# Patient Record
Sex: FEMALE | Race: BLACK OR AFRICAN AMERICAN | URBAN - METROPOLITAN AREA
[De-identification: names, ages, dates, MRNs, and addresses within clinical notes are randomized per-mention and may not be internally consistent; named-entity substitution may affect disease eponyms.]

---

## 2021-10-22 ENCOUNTER — TELEPHONE (OUTPATIENT)
Dept: DIABETES SERVICES | Age: 61
End: 2021-10-22

## 2021-10-22 NOTE — TELEPHONE ENCOUNTER
Call to patient about Diabetes Education. She has not had education since on Medicare. Assessment scheduled.

## 2021-12-07 ENCOUNTER — TELEPHONE (OUTPATIENT)
Dept: DIABETES SERVICES | Age: 61
End: 2021-12-07

## 2021-12-13 ENCOUNTER — TELEPHONE (OUTPATIENT)
Dept: DIABETES SERVICES | Age: 61
End: 2021-12-13

## 2021-12-13 NOTE — TELEPHONE ENCOUNTER
Mail box is full. Second attempt to reschedule after her no show for Diabetes assessment on 12-7-2021. .  Will mail letter. If we do not hear back from the letter by January 10, 2022, we will close the Diabetes file and notify physician.

## 2022-01-11 ENCOUNTER — DOCUMENTATION ONLY (OUTPATIENT)
Dept: DIABETES SERVICES | Age: 62
End: 2022-01-11

## 2022-01-11 NOTE — PROGRESS NOTES
Received no response from mailed letter about Diabetes Education. Will close file and notify referring.

## 2022-02-28 PROBLEM — E10.65 HYPERGLYCEMIA DUE TO TYPE 1 DIABETES MELLITUS (HCC): Status: ACTIVE | Noted: 2022-02-28

## 2022-02-28 PROBLEM — E10.22 CKD STAGE 2 DUE TO TYPE 1 DIABETES MELLITUS (HCC): Status: ACTIVE | Noted: 2022-02-28

## 2022-02-28 PROBLEM — R80.1 PERSISTENT PROTEINURIA: Status: ACTIVE | Noted: 2022-02-28

## 2022-02-28 PROBLEM — N18.2 CKD STAGE 2 DUE TO TYPE 1 DIABETES MELLITUS (HCC): Status: ACTIVE | Noted: 2022-02-28

## 2022-03-01 ENCOUNTER — TELEPHONE (OUTPATIENT)
Dept: DIABETES SERVICES | Age: 62
End: 2022-03-01

## 2022-03-08 ENCOUNTER — TELEPHONE (OUTPATIENT)
Dept: DIABETES SERVICES | Age: 62
End: 2022-03-08

## 2022-03-08 NOTE — TELEPHONE ENCOUNTER
Second attempt to contact pt. Mailbox is full and cannot leave a message. Will send a letter 3/9/22 and ask for a call back. If we don't hear from pt by 4/9/22 we will no longer pursue.

## 2022-03-19 PROBLEM — E10.65 HYPERGLYCEMIA DUE TO TYPE 1 DIABETES MELLITUS (HCC): Status: ACTIVE | Noted: 2022-02-28

## 2022-03-19 PROBLEM — R80.1 PERSISTENT PROTEINURIA: Status: ACTIVE | Noted: 2022-02-28

## 2022-03-19 PROBLEM — N18.2 CKD STAGE 2 DUE TO TYPE 1 DIABETES MELLITUS (HCC): Status: ACTIVE | Noted: 2022-02-28

## 2022-03-19 PROBLEM — E10.22 CKD STAGE 2 DUE TO TYPE 1 DIABETES MELLITUS (HCC): Status: ACTIVE | Noted: 2022-02-28

## 2022-04-11 ENCOUNTER — TELEPHONE (OUTPATIENT)
Dept: DIABETES SERVICES | Age: 62
End: 2022-04-11

## 2022-05-23 ENCOUNTER — TELEPHONE (OUTPATIENT)
Dept: ENDOCRINOLOGY | Age: 62
End: 2022-05-23

## 2022-05-23 DIAGNOSIS — E10.65 HYPERGLYCEMIA DUE TO TYPE 1 DIABETES MELLITUS (HCC): Primary | ICD-10-CM

## 2022-05-23 RX ORDER — PEN NEEDLE, DIABETIC 32GX 5/32"
1 NEEDLE, DISPOSABLE MISCELLANEOUS DAILY
Qty: 300 EACH | Refills: 3 | Status: SHIPPED | OUTPATIENT
Start: 2022-05-23 | End: 2022-06-01 | Stop reason: SDUPTHER

## 2022-05-23 RX ORDER — PEN NEEDLE, DIABETIC 32GX 5/32"
1 NEEDLE, DISPOSABLE MISCELLANEOUS DAILY
COMMUNITY
End: 2022-05-23 | Stop reason: SDUPTHER

## 2022-05-23 NOTE — TELEPHONE ENCOUNTER
Pt called and stated She needed a refill on her Pen needles. Pt stated she use 32 g by 4 mm, and wants them to go to [facility]. Rx has been sent to [facility] as requested.

## 2022-06-01 ENCOUNTER — OFFICE VISIT (OUTPATIENT)
Dept: ENDOCRINOLOGY | Age: 62
End: 2022-06-01
Payer: MEDICARE

## 2022-06-01 VITALS
OXYGEN SATURATION: 98 % | HEIGHT: 69 IN | SYSTOLIC BLOOD PRESSURE: 142 MMHG | WEIGHT: 152 LBS | HEART RATE: 77 BPM | BODY MASS INDEX: 22.51 KG/M2 | DIASTOLIC BLOOD PRESSURE: 80 MMHG | TEMPERATURE: 98.4 F

## 2022-06-01 DIAGNOSIS — I10 ESSENTIAL HYPERTENSION: ICD-10-CM

## 2022-06-01 DIAGNOSIS — E10.29 MICROALBUMINURIA DUE TO TYPE 1 DIABETES MELLITUS (HCC): ICD-10-CM

## 2022-06-01 DIAGNOSIS — R80.9 MICROALBUMINURIA DUE TO TYPE 1 DIABETES MELLITUS (HCC): ICD-10-CM

## 2022-06-01 DIAGNOSIS — Z13.21 ENCOUNTER FOR VITAMIN DEFICIENCY SCREENING: ICD-10-CM

## 2022-06-01 DIAGNOSIS — E78.00 HYPERCHOLESTEROLEMIA: ICD-10-CM

## 2022-06-01 DIAGNOSIS — E10.65 HYPERGLYCEMIA DUE TO TYPE 1 DIABETES MELLITUS (HCC): Primary | ICD-10-CM

## 2022-06-01 LAB — HBA1C MFR BLD: 12.3 %

## 2022-06-01 PROCEDURE — G8420 CALC BMI NORM PARAMETERS: HCPCS | Performed by: DIETITIAN, REGISTERED

## 2022-06-01 PROCEDURE — 99215 OFFICE O/P EST HI 40 MIN: CPT | Performed by: DIETITIAN, REGISTERED

## 2022-06-01 PROCEDURE — 3046F HEMOGLOBIN A1C LEVEL >9.0%: CPT | Performed by: DIETITIAN, REGISTERED

## 2022-06-01 PROCEDURE — 2022F DILAT RTA XM EVC RTNOPTHY: CPT | Performed by: DIETITIAN, REGISTERED

## 2022-06-01 PROCEDURE — 3017F COLORECTAL CA SCREEN DOC REV: CPT | Performed by: DIETITIAN, REGISTERED

## 2022-06-01 PROCEDURE — G8427 DOCREV CUR MEDS BY ELIG CLIN: HCPCS | Performed by: DIETITIAN, REGISTERED

## 2022-06-01 PROCEDURE — 1036F TOBACCO NON-USER: CPT | Performed by: DIETITIAN, REGISTERED

## 2022-06-01 PROCEDURE — 83036 HEMOGLOBIN GLYCOSYLATED A1C: CPT | Performed by: DIETITIAN, REGISTERED

## 2022-06-01 RX ORDER — BLOOD-GLUCOSE,RECEIVER,CONT
EACH MISCELLANEOUS
COMMUNITY
End: 2022-06-01 | Stop reason: ALTCHOICE

## 2022-06-01 RX ORDER — BLOOD-GLUCOSE TRANSMITTER
EACH MISCELLANEOUS
COMMUNITY
End: 2022-06-01 | Stop reason: ALTCHOICE

## 2022-06-01 RX ORDER — PEN NEEDLE, DIABETIC 32GX 5/32"
1 NEEDLE, DISPOSABLE MISCELLANEOUS DAILY
Qty: 300 EACH | Refills: 11 | Status: SHIPPED | OUTPATIENT
Start: 2022-06-01

## 2022-06-01 RX ORDER — BLOOD SUGAR DIAGNOSTIC
1 STRIP MISCELLANEOUS 4 TIMES DAILY
COMMUNITY
End: 2022-06-01 | Stop reason: ALTCHOICE

## 2022-06-01 RX ORDER — FINERENONE 10 MG/1
10 TABLET, FILM COATED ORAL DAILY
Qty: 90 TABLET | Refills: 11 | Status: SHIPPED | OUTPATIENT
Start: 2022-06-01 | End: 2022-08-12 | Stop reason: SINTOL

## 2022-06-01 RX ORDER — INSULIN GLARGINE 100 [IU]/ML
24 INJECTION, SOLUTION SUBCUTANEOUS DAILY
COMMUNITY
End: 2022-06-01 | Stop reason: SDUPTHER

## 2022-06-01 RX ORDER — INSULIN GLARGINE 100 [IU]/ML
28 INJECTION, SOLUTION SUBCUTANEOUS EVERY MORNING
Qty: 27 ML | Refills: 11 | Status: SHIPPED | OUTPATIENT
Start: 2022-06-01 | End: 2022-08-12 | Stop reason: SDUPTHER

## 2022-06-01 RX ORDER — INSULIN ASPART 100 [IU]/ML
INJECTION, SOLUTION INTRAVENOUS; SUBCUTANEOUS
Qty: 27 ML | Refills: 11 | Status: SHIPPED | OUTPATIENT
Start: 2022-06-01 | End: 2022-06-01 | Stop reason: DRUGHIGH

## 2022-06-01 RX ORDER — LOVASTATIN 20 MG/1
20 TABLET ORAL NIGHTLY
COMMUNITY
End: 2022-06-01 | Stop reason: SDUPTHER

## 2022-06-01 RX ORDER — BLOOD-GLUCOSE METER
1 KIT MISCELLANEOUS DAILY
COMMUNITY
End: 2022-06-01 | Stop reason: SDUPTHER

## 2022-06-01 RX ORDER — LOVASTATIN 20 MG/1
20 TABLET ORAL NIGHTLY
Qty: 90 TABLET | Refills: 11 | Status: SHIPPED | OUTPATIENT
Start: 2022-06-01

## 2022-06-01 RX ORDER — BLOOD-GLUCOSE SENSOR
EACH MISCELLANEOUS
COMMUNITY
End: 2022-06-01 | Stop reason: ALTCHOICE

## 2022-06-01 RX ORDER — BLOOD-GLUCOSE METER
1 KIT MISCELLANEOUS DAILY
Qty: 200 EACH | Refills: 11 | Status: SHIPPED | OUTPATIENT
Start: 2022-06-01

## 2022-06-01 RX ORDER — INSULIN ASPART 100 [IU]/ML
4 INJECTION, SOLUTION INTRAVENOUS; SUBCUTANEOUS
Qty: 45 ML | Refills: 3 | Status: SHIPPED | OUTPATIENT
Start: 2022-06-01 | End: 2022-08-12 | Stop reason: SDUPTHER

## 2022-06-01 RX ORDER — INSULIN ASPART 100 [IU]/ML
8 INJECTION, SOLUTION INTRAVENOUS; SUBCUTANEOUS
COMMUNITY
End: 2022-06-01 | Stop reason: SDUPTHER

## 2022-06-01 ASSESSMENT — ENCOUNTER SYMPTOMS
CONSTIPATION: 0
BACK PAIN: 0
TROUBLE SWALLOWING: 0
ABDOMINAL PAIN: 0
VOICE CHANGE: 0
WHEEZING: 0
SHORTNESS OF BREATH: 0
VOMITING: 0
DIARRHEA: 0
COUGH: 0
NAUSEA: 0

## 2022-06-01 NOTE — PROGRESS NOTES
[facility] ENDOCRINOLOGY   AND   [facility]    [place] Me[place], [address] [facility], [address]  Phone [phone]  Facsimile [phone]      Reason for visit: Steve ATKINS (1960) is here for follow up of Type 1 Diabetes Mellitus. ASSESSMENT AND PLAN:    1. Hyperglycemia due to type 1 diabetes mellitus (HCC)  A1c is 12.3%. Glycemic control is suboptimal.  Patient attempted to use Dexcom G6 CGM technology. Patient reports it caused her great anxiety because she saw her numbers dropping all the time. Patient reports she would eat glucose tabs in order to recover her blood sugars which caused hyperglycemia. Patient reports she has been to diabetes education in the past.  Patient declines return visit to diabetes education. Patient is likely over both bolusing insulin at mealtime. Patient did not bring glucose readings to visit today. --- Instructed patient to check blood sugars 4 times daily and bring log to next visit. --- Increase Lantus dose to 28 units once daily. --- Reduce NovoLog to 4 units AC 3 times daily with correction 1 for 50 above 150.   --- F/u in 1 month    - AMB POC HEMOGLOBIN A1C  - LANTUS SOLOSTAR 100 UNIT/ML injection pen; Inject 28 Units into the skin every morning Max daily Dose:30 units. Dispense: 27 mL; Refill: 11  - BD PEN NEEDLE KRISTOPHER 2ND GEN 32G X 4 MM MISC; 1 each by Does not apply route daily  Dispense: 300 each; Refill: 11  - FREESTYLE LITE strip; 1 each by In Vitro route daily Check BG 4 times daily. E10.65  Dispense: 200 each; Refill: 11  - Comprehensive Metabolic Panel; Future  - NOVOLOG FLEXPEN 100 UNIT/ML injection pen; Inject 4 Units into the skin 3 times daily (before meals) Add correction 1 for 50 above 150 mg/dL. Max Daily Dose: 50 units. E10.65. Dispense: 45 mL; Refill: 3    2. Hypercholesterolemia  Last  above goal of less than 70. Patient taking lovastatin 20 mg at night.   Recheck lipid panel.  - Lipid Panel; Future    3. Essential hypertension  BP elevated. Discuss increase dose of lisinopril at future visit. BP Readings from Last 3 Encounters:   06/01/22 (!) 142/80   02/28/22 (!) 140/80   12/28/21 (!) 142/80     - lovastatin (MEVACOR) 20 MG tablet; Take 1 tablet by mouth nightly  Dispense: 90 tablet; Refill: 11    4. Microalbuminuria due to type 1 diabetes mellitus ([facility] [address]  Restart kerendia - per patient request.  - KERENDIA 10 MG TABS; Take 10 mg by mouth daily  Dispense: 90 tablet; Refill: 11    5. Encounter for vitamin deficiency screening  - Vitamin D 25 Hydroxy; Future      Follow-up and Dispositions    · Return in about 1 month (around 7/1/2022). Routing History          Tests or Results Reviewed: (see lab dates below in note) HgbA1C, Cr, GFR, Microalbumin/Cr ratio, Lipid Profile, TSH. History of Present Illness:        Current Diabetes Medications: Lantus 30 units in the morning,  Novolog - 10 units,       Followed by [facility]  Ping Hull      Referred by YESI HernandezP, PCP. Patient has been using a Dexcom G6, but it malfunctioned last month with high and low readings that were verified by glucometer as inaccurate. Patient reports concern that meter did not work so she took it off. She  says she does not want to revisit the possiblity of a new CGM because she is fearful of 'lows' and thinks her CGM will not read accurately.       Unexplained weight loss in past 2 months.  Patient fears it is cancer- explained likely due to hyperglycemia. Patient will be going to cardiology next month to establish as a new patient due to recent and intermittent heart palpitations with physical  activity and left-sided muscular pain upper chest region.       Patient is keeping blood sugar records but checking blood sugar 4 times a day of which to times is 2 hours after meals and dosing NovoLog based on postprandial hyperglycemia with a correction scale.       Goal - keep BG at 150.     Date of DM diagnosis: 2010, went to doctor and diagnosed.       Current Diabetes Medications: Lantus 30 units in 12 noon. Novolog 12 units B, 8 units L, 8 units D. Correction scale: 2 units  for BG >250 if >300  Takes 3 units novolog.       Medication Failures: none       Current symptoms: See Review of Systems       Neuropathy: no s/s.        Nephropathy: Stage 3 Kidney Disease   02/18/2020      microalbumin/Cr ratio    05/10/2021      microalbumin/Cr ratio >300   09/29/2021      Cr 1.02, GFR 69,    10/04/2021      Cr 1.34, GFR 49   12/14/2021      Cr 0.94, GFR 75   02/16/2022      Cr 1.13, GFR 60, microalbumin/Cr ratio 217      Retinopathy: Last eye exam was 12/27/2021 and demonstrated no evidence of diabetic retinopathy.  Small evidence of new onset  of cataracts. Eye care specialist is [facility] in [place].       Diet: morning biggest meal, grits, egg, 3 pieces flood, toast, coffee - sweet and low- \"raises blood sugar\". Lunch - ham sandwich or  cheese burger or chicken wings, steak salad. Dinner - 6 pm ham sandwich. Bedtime 10 pm  Sunday biggest meal at dinner - vegetables potato and meat. Water. Diet soda. 4-8 oz regular soda - once a week. Snacks - banana or apple.       Exercise:  Walking at track 3 times a week - 15 minutes each time.       Diabetes education: The patient has not received formal diabetes education.        Hemoglobin A1c:   02/10/2021      HgbA1C:         8.8%   05/10/2021      HgbA1C:         9.3%   08/02/2021      HgbA1C:         9.9%   12/28/2021      HgbA1C:         9.6%  06/01/2022     12.3%       C-Peptide:   12/14/2021      <0.01   12/14/2021      Fasting glucose 226      IA-2 Antibody:   12/14/2021         >350 (<5.4)      MARIBELL-65:   12/14/2021         >250 (<5)       Home blood glucose monitoring frequency: 4 times per day       Blood glucose: by verbal review (unable to download meter).                fasting 188- 200               2 hours after breakfast; 160             Before lunch 220               Dinner: 220              bedtime 175-200 (takes 1 unit if >200)       Hypoglycemia: rare. Lowest 33, 2 weeks ago at 12 or 2 am,        Lipids:    05/29/2019 TC- 202, LDL- 122, VLDL- 12,  HDL- 68, TG- 59   11/18/2019  TC- 194, LDL- 113, VLDL- 15,  HDL- 66, TG- 76   12/10/2020  TC- 185, LDL- 100, VLDL- 14,  HDL- 71, TG- none   12/14/2021  TC- 196, LDL- 127, VLDL- 16,  HDL- 53, TG- 78       Thyroid:    11/18/2019      TSH     1.090   12/10/2020      TSH     0.769   02/16/2022      TSH     0.807       BP:                 BP Readings from Last 3 Encounters:        10/21/21  126/82         Weight Trends:                 Wt Readings from Last 3 Encounters:        10/21/21  152 lb (68.9 kg)             Medical/Surgical/Social/Family History: Reviewed in Chart       Medications: Reviewed in chart       Allergies   Patient has no known allergies. BP (!) 142/80   Pulse 77   Temp 98.4 °F (36.9 °C) (Tympanic)   Ht 5' 9\" (1.753 m)   Wt 152 lb (68.9 kg)   SpO2 98%   BMI 22.45 kg/m²     Weight Trends: Wt Readings from Last 3 Encounters:   06/01/22 152 lb (68.9 kg)   02/28/22 154 lb (69.9 kg)   12/28/21 156 lb (70.8 kg)       Allergies and Medications: Reviewed in Chart    Review of Systems   Constitutional: Negative for appetite change, diaphoresis, fatigue and unexpected weight change. HENT: Negative for trouble swallowing and voice change. Eyes: Negative for visual disturbance. Respiratory: Negative for cough, shortness of breath and wheezing. Cardiovascular: Negative for chest pain, palpitations and leg swelling. Gastrointestinal: Negative for abdominal pain, constipation, diarrhea, nausea and vomiting. Endocrine: Negative for cold intolerance, heat intolerance, polydipsia, polyphagia and polyuria. Genitourinary: Negative for difficulty urinating and frequency.    Musculoskeletal: Positive for arthralgias (Right knee- frozen, pain, limited mobility) and myalgias. Negative for back pain. Skin: Negative for pallor. Neurological: Negative for dizziness, tremors, weakness, numbness and headaches. Hematological: Negative for adenopathy. Psychiatric/Behavioral: Negative for dysphoric mood and sleep disturbance. The patient is not nervous/anxious. Physical Exam  Constitutional:       General: She is not in acute distress. Appearance: Normal appearance. She is normal weight. She is not ill-appearing. HENT:      Head: Normocephalic. Cardiovascular:      Rate and Rhythm: Normal rate and regular rhythm. Pulses: Normal pulses. Pulmonary:      Effort: No respiratory distress. Breath sounds: Normal breath sounds. No wheezing or rhonchi. Chest:      Chest wall: No tenderness. Abdominal:      General: There is no distension. Palpations: Abdomen is soft. Tenderness: There is no abdominal tenderness. There is no guarding. Musculoskeletal:         General: No swelling, tenderness or signs of injury. Cervical back: Neck supple. No tenderness. Right lower leg: No edema. Left lower leg: No edema. Feet:      Right foot:      Skin integrity: Skin integrity normal. No ulcer. Left foot:      Skin integrity: Skin integrity normal. No ulcer. Lymphadenopathy:      Cervical: No cervical adenopathy. Skin:     General: Skin is warm and dry. Findings: No erythema or rash. Neurological:      Mental Status: She is alert. Motor: No weakness.    Psychiatric:         Mood and Affect: Mood normal.         Behavior: Behavior normal.         Orders Placed This Encounter   Procedures    Comprehensive Metabolic Panel     Standing Status:   Future     Standing Expiration Date:   6/1/2023    Lipid Panel     Standing Status:   Future     Standing Expiration Date:   6/1/2023     Order Specific Question:   Is Patient Fasting?/# of Hours     Answer:   yes    Vitamin D 25 Hydroxy     Standing Status:   Future     Standing Expiration Date:   6/1/2023    AMB POC HEMOGLOBIN A1C       Current Outpatient Medications   Medication Sig Dispense Refill    LANTUS SOLOSTAR 100 UNIT/ML injection pen Inject 28 Units into the skin every morning Max daily Dose:30 units. 27 mL 11    BD PEN NEEDLE KRISTOPHER 2ND GEN 32G X 4 MM MISC 1 each by Does not apply route daily 300 each 11    FREESTYLE LITE strip 1 each by In Vitro route daily Check BG 4 times daily. E10.65 200 each 11    lovastatin (MEVACOR) 20 MG tablet Take 1 tablet by mouth nightly 90 tablet 11    KERENDIA 10 MG TABS Take 10 mg by mouth daily 90 tablet 11    NOVOLOG FLEXPEN 100 UNIT/ML injection pen Inject 4 Units into the skin 3 times daily (before meals) Add correction 1 for 50 above 150 mg/dL. Max Daily Dose: 50 units. E10.65. 45 mL 3    Lancets MISC 1 Package by Other route 4 times daily      amLODIPine (NORVASC) 10 MG tablet Take 10 mg by mouth daily      cyclobenzaprine (FLEXERIL) 5 MG tablet Take 5 mg by mouth as needed      Glucagon (GVOKE HYPOPEN 2-PACK) 1 MG/0.2ML SOAJ Inject 1 mg into the skin as needed      glucagon 1 MG injection 1 mg as needed      lisinopril (PRINIVIL;ZESTRIL) 40 MG tablet TAKE 1 TABLET BY MOUTH EVERY DAY       No current facility-administered medications for this visit. JUSTINO Colunga NP    On this date 6/1/2022 I have spent 45 minutes reviewing previous notes, test results and face to face with the patient discussing the diagnosis and importance of compliance with the treatment plan as well as documenting on the day of the visit. Portions of this note were generated with the assistance of voice recognition software. As such, some errors in transcription may be present.

## 2022-06-02 ENCOUNTER — TELEPHONE (OUTPATIENT)
Dept: ENDOCRINOLOGY | Age: 62
End: 2022-06-02

## 2022-06-02 NOTE — TELEPHONE ENCOUNTER
PA has been completed via paper and faxed to [phone] and currently waiting for insurance to deny/approve request.

## 2022-06-02 NOTE — TELEPHONE ENCOUNTER
----- Message from [address], APRN - NP sent at 6/1/2022  3:52 PM EDT -----  Please assist with Kerendia 10 mg [facility]

## 2022-07-01 PROBLEM — Z13.21 ENCOUNTER FOR VITAMIN DEFICIENCY SCREENING: Status: RESOLVED | Noted: 2022-06-01 | Resolved: 2022-07-01

## 2022-07-02 DIAGNOSIS — I10 ESSENTIAL (PRIMARY) HYPERTENSION: ICD-10-CM

## 2022-07-05 RX ORDER — AMLODIPINE BESYLATE 10 MG/1
TABLET ORAL
Qty: 30 TABLET | Refills: 3 | OUTPATIENT
Start: 2022-07-05

## 2022-08-12 ENCOUNTER — OFFICE VISIT (OUTPATIENT)
Dept: ENDOCRINOLOGY | Age: 62
End: 2022-08-12
Payer: MEDICARE

## 2022-08-12 VITALS
DIASTOLIC BLOOD PRESSURE: 88 MMHG | WEIGHT: 160.2 LBS | SYSTOLIC BLOOD PRESSURE: 128 MMHG | HEIGHT: 69 IN | BODY MASS INDEX: 23.73 KG/M2 | HEART RATE: 87 BPM | TEMPERATURE: 97.6 F | OXYGEN SATURATION: 97 %

## 2022-08-12 DIAGNOSIS — E10.65 TYPE 1 DIABETES MELLITUS WITH HYPERGLYCEMIA (HCC): Primary | ICD-10-CM

## 2022-08-12 PROCEDURE — 3017F COLORECTAL CA SCREEN DOC REV: CPT | Performed by: DIETITIAN, REGISTERED

## 2022-08-12 PROCEDURE — 1036F TOBACCO NON-USER: CPT | Performed by: DIETITIAN, REGISTERED

## 2022-08-12 PROCEDURE — G8427 DOCREV CUR MEDS BY ELIG CLIN: HCPCS | Performed by: DIETITIAN, REGISTERED

## 2022-08-12 PROCEDURE — 99215 OFFICE O/P EST HI 40 MIN: CPT | Performed by: DIETITIAN, REGISTERED

## 2022-08-12 PROCEDURE — G8420 CALC BMI NORM PARAMETERS: HCPCS | Performed by: DIETITIAN, REGISTERED

## 2022-08-12 PROCEDURE — 2022F DILAT RTA XM EVC RTNOPTHY: CPT | Performed by: DIETITIAN, REGISTERED

## 2022-08-12 PROCEDURE — 3046F HEMOGLOBIN A1C LEVEL >9.0%: CPT | Performed by: DIETITIAN, REGISTERED

## 2022-08-12 RX ORDER — FLASH GLUCOSE SENSOR
2 KIT MISCELLANEOUS
Qty: 1 EACH | Refills: 3 | Status: SHIPPED | OUTPATIENT
Start: 2022-08-12

## 2022-08-12 RX ORDER — INSULIN GLARGINE 100 [IU]/ML
38 INJECTION, SOLUTION SUBCUTANEOUS EVERY MORNING
Qty: 39 ML | Refills: 11 | Status: SHIPPED | OUTPATIENT
Start: 2022-08-12 | End: 2022-09-20 | Stop reason: SDUPTHER

## 2022-08-12 RX ORDER — INSULIN ASPART 100 [IU]/ML
6 INJECTION, SOLUTION INTRAVENOUS; SUBCUTANEOUS
Qty: 45 ML | Refills: 3 | Status: SHIPPED | OUTPATIENT
Start: 2022-08-12 | End: 2022-09-20 | Stop reason: SDUPTHER

## 2022-08-12 ASSESSMENT — ENCOUNTER SYMPTOMS
ABDOMINAL PAIN: 0
CONSTIPATION: 0
DIARRHEA: 0
TROUBLE SWALLOWING: 0
BACK PAIN: 0
COUGH: 0
SHORTNESS OF BREATH: 0
WHEEZING: 0
VOMITING: 0
NAUSEA: 0
VOICE CHANGE: 0

## 2022-08-12 NOTE — PROGRESS NOTES
[facility] ENDOCRINOLOGY   AND   THYROID NODULE CLINIC    GIANNI Banks  [address], [address] [facility], [address] [place] [address]  Phone [phone]  Facsimile [phone]      Reason for visit: Ping ATKINS (1960) is here for follow up of Type 1 Diabetes Mellitus. ASSESSMENT AND PLAN:    The beneficiary requires a therapeutic CGM for treatment and management of diabetes mellitus. The beneficiary has been using a home blood glucose monitor and performing frequent (4 or more times a day) blood glucose testing. The beneficiary is insulin treated with 3 or more daily injections of insulin or a continuous subcutaneous insulin infusion pump. The beneficiary's insulin treatment regimen requires frequent adjustments by the beneficiary on the basis of therapeutic CGM testing results. 1. Type 1 diabetes mellitus with hyperglycemia (HCC)  A1c is 12.3%. Patient has fear of nighttime hypoglycemia. It is not evident that patient is taking her NovoLog as prescribed. Patient is taking higher novolog dose than recommended at mealtime which causes her to drop below 90 withy symptoms of hypoglycemia. No glucose readings brought to visit today. She has used Dexcom in the past but refuses to use it due to fear of seeing her blood sugars drop from taking insulin. Instructed patient her fasting blood sugar goal is . Instructed patient to inject NovoLog within 15 minutes prior to the meal.  --- Increase Lantus 38 units every morning with instructions to titrate by 2 units every 3 days to fasting blood sugar goal of .  --- Increase NovoLog 6 units AC 3 times daily with correction 1 for 50 above 150.  --- Follow-up in 1 month. - Continuous Blood Gluc Sensor (FREESTYLE KAREN 2 SENSOR) MISC; 2 each by Does not apply route 4 times daily (before meals and nightly)  Dispense: 1 each; Refill: 3  - [facility]  - LANTUS SOLOSTAR 100 UNIT/ML injection pen;  Inject 38 Units into the skin every morning Titrate by 2 units every 3 days to fasting BG goal of  mg/dL. Max daily Dose: 80 units. Dispense: 39 mL; Refill: 11  - NOVOLOG FLEXPEN 100 UNIT/ML injection pen; Inject 6 Units into the skin in the morning and 6 Units at noon and 6 Units in the evening. Inject before meals. Add correction 1 for 50 above 150 mg/dL. Max Daily Dose: 50 units. E10.65. Leola Carrillo Dispense: 45 mL; Refill: 3    2. Hypercholesterolemia  Last  above goal of less than 70. Patient taking lovastatin 20 mg at night. Recheck lipid panel.  - Lipid Panel; Future - previously ordered- not complete.  - lovastatin (MEVACOR) 20 MG tablet; Take 1 tablet by mouth nightly  Dispense: 90 tablet; Refill: 11     3. Essential hypertension  BP controlled. BP Readings from Last 3 Encounters:   08/12/22 128/88   06/01/22 (!) 142/80   02/28/22 (!) 140/80   --- Amlodipine 10 mg daily. --- Lisinopril 40 mg daily. 4. Albuminuria. Patient is taking ACE inhibitor BP is controlled. Patient will benefit A1c reduction to goal of 7%. Follow-up and Dispositions    Return in about 1 month (around 9/12/2022). Tests or Results Reviewed: (see lab dates below in note) HgbA1C, Cr, GFR, Microalbumin/Cr ratio, Lipid Profile, TSH. History of Present Illness:        Current Diabetes Medications: Lantus 32 units in the morning,  Novolog - 8 units with breakfast, 4-6 units with Lunch,        Followed by [facility]  [place]      Referred by PARVEZ Guevara, PCP. Patient has been using a Dexcom G6, but it malfunctioned last month with high and low readings that were verified by glucometer as inaccurate. Patient reports concern that meter did not work so she took it off. She  says she does not want to revisit the possiblity of a new CGM because she is fearful of 'lows' and thinks her CGM will not read accurately. Unexplained weight loss in past 2 months.  Patient fears it is cancer- explained likely due to hyperglycemia. Patient will be going to cardiology next month to establish as a new patient due to recent and intermittent heart palpitations with physical  activity and left-sided muscular pain upper chest region. Patient is keeping blood sugar records but checking blood sugar 4 times a day of which to times is 2 hours after meals and dosing NovoLog based on postprandial hyperglycemia with a correction scale. Goal - keep BG at 150. Date of DM diagnosis: 2010, went to doctor and diagnosed. Current Diabetes Medications: Lantus 30 units in 12 noon. Novolog 12 units B, 8 units L, 8 units D. Correction scale: 2 units  for BG >250 if >300  Takes 3 units novolog. Medication Failures: none       Current symptoms: See Review of Systems       Neuropathy: no s/s. Nephropathy: Stage 3 Kidney Disease   02/18/2020      microalbumin/Cr ratio    05/10/2021      microalbumin/Cr ratio >300   09/29/2021      Cr 1.02, GFR 69,    10/04/2021      Cr 1.34, GFR 49   12/14/2021      Cr 0.94, GFR 75   02/16/2022      Cr 1.13, GFR 60, microalbumin/Cr ratio 217  08/03/2022 Cr 0.98, GFR 65, microalbumin/Cr ratio 192.5     Retinopathy: Last eye exam was 12/27/2021 and demonstrated no evidence of diabetic retinopathy. Small evidence of new onset  of cataracts. Eye care specialist is NICA CIRILO in [place]. Diet: morning biggest meal, grits, egg, 3 pieces flood, toast, coffee - sweet and low- \"raises blood sugar\". Lunch - ham sandwich or  cheese burger or chicken wings, steak salad. Dinner - 6 pm ham sandwich. Bedtime 10 pm  Sunday biggest meal at dinner - vegetables potato and meat. Water. Diet soda. 4-8 oz regular soda - once a week. Snacks - banana or apple. Exercise:  Walking at track 3 times a week - 15 minutes each time. Diabetes education: The patient has not received formal diabetes education.        Hemoglobin A1c:   02/10/2021      HgbA1C:         8.8% 05/10/2021      HgbA1C:         9.3%   08/02/2021      HgbA1C:         9.9%   12/28/2021      HgbA1C:         9.6%  06/01/2022      12.3%       C-Peptide:   12/14/2021      <0.01   12/14/2021      Fasting glucose 226      IA-2 Antibody:   12/14/2021         >350 (<5.4)      MARIBELL-65:   12/14/2021         >250 (<5)       Home blood glucose monitoring frequency: 4 times per day       Blood glucose: by verbal review (unable to download meter). fasting 188- 200               2 hours after breakfast; 160               Before lunch 220               Dinner: 220              bedtime 175-200 (takes 1 unit if >200)       Hypoglycemia: rare. Lowest 33, 2 weeks ago at 12 or 2 am,        Lipids:    05/29/2019 TC- 202, LDL- 122, VLDL- 12,  HDL- 68, TG- 59   11/18/2019  TC- 194, LDL- 113, VLDL- 15,  HDL- 66, TG- 76   12/10/2020  TC- 185, LDL- 100, VLDL- 14,  HDL- 71, TG- none   12/14/2021  TC- 196, LDL- 127, VLDL- 16,  HDL- 53, TG- 78       Thyroid:    11/18/2019      TSH     1.090   12/10/2020      TSH     0.769   02/16/2022      TSH     0.807  08/03/2022  0.351       BP:                 BP Readings from Last 3 Encounters:        10/21/21  126/82         Weight Trends: Wt Readings from Last 3 Encounters:        10/21/21  152 lb (68.9 kg)             Medical/Surgical/Social/Family History: Reviewed in Chart       Medications: Reviewed in chart       Allergies   Patient has no known allergies. BP (!) 142/80   Pulse 77   Temp 98.4 °F (36.9 °C) (Tympanic)   Ht 5' 9\" (1.753 m)   Wt 152 lb (68.9 kg)   SpO2 98%   BMI 22.45 kg/m²      Weight Trends: Wt Readings from Last 3 Encounters:   06/01/22 152 lb (68.9 kg)   02/28/22 154 lb (69.9 kg)   12/28/21 156 lb (70.8 kg)         Allergies and Medications: Reviewed in Chart           /88   Pulse 87   Temp 97.6 °F (36.4 °C) (Tympanic)   Ht 5' 9\" (1.753 m)   Wt 160 lb 3.2 oz (72.7 kg)   SpO2 97%   BMI 23.66 kg/m²     Weight Trends:   Wt Readings from Last 3 Encounters:   08/12/22 160 lb 3.2 oz (72.7 kg)   06/01/22 152 lb (68.9 kg)   02/28/22 154 lb (69.9 kg)       Allergies and Medications: Reviewed in Chart    Review of Systems   Constitutional:  Positive for unexpected weight change (weight gain a few pounds). Negative for appetite change, diaphoresis and fatigue. HENT:  Negative for trouble swallowing and voice change. Eyes:  Negative for visual disturbance. Respiratory:  Negative for cough, shortness of breath and wheezing. Cardiovascular:  Positive for palpitations (when sugar drops). Negative for chest pain and leg swelling. Gastrointestinal:  Negative for abdominal pain, constipation, diarrhea, nausea and vomiting. Endocrine: Negative for cold intolerance, heat intolerance, polydipsia, polyphagia and polyuria. Genitourinary:  Negative for difficulty urinating and frequency. Musculoskeletal:  Negative for arthralgias, back pain and myalgias. Skin:  Negative for pallor. Neurological:  Negative for dizziness, tremors, weakness, numbness and headaches. Hematological:  Negative for adenopathy. Psychiatric/Behavioral:  Positive for sleep disturbance. Negative for dysphoric mood. The patient is not nervous/anxious. Physical Exam  Constitutional:       General: She is not in acute distress. Appearance: Normal appearance. She is normal weight. She is not ill-appearing. HENT:      Head: Normocephalic. Cardiovascular:      Rate and Rhythm: Normal rate and regular rhythm. Pulses: Normal pulses. Pulmonary:      Effort: No respiratory distress. Breath sounds: Normal breath sounds. No wheezing or rhonchi. Chest:      Chest wall: No tenderness. Abdominal:      General: There is no distension. Palpations: Abdomen is soft. Tenderness: There is no abdominal tenderness. There is no guarding. Musculoskeletal:         General: No swelling, tenderness or signs of injury.       Cervical back: Neck supple. No tenderness. Right lower leg: No edema. Left lower leg: No edema. Feet:      Right foot:      Skin integrity: Skin integrity normal. No ulcer. Left foot:      Skin integrity: Skin integrity normal. No ulcer. Lymphadenopathy:      Cervical: No cervical adenopathy. Skin:     General: Skin is warm and dry. Findings: No erythema or rash. Neurological:      Mental Status: She is alert. Motor: No weakness. Psychiatric:         Mood and Affect: Mood normal.         Behavior: Behavior normal.       Orders Placed This Encounter   Procedures    [facility] Diabetic Treatment     Referral Priority:   Routine     Referral Type:   Eval and Treat     Referral Reason:   Specialty Services Required     Number of Visits Requested:   1       Current Outpatient Medications   Medication Sig Dispense Refill    Continuous Blood Gluc Sensor (FREESTYLE KAREN 2 SENSOR) MISC 2 each by Does not apply route 4 times daily (before meals and nightly) 1 each 3    LANTUS SOLOSTAR 100 UNIT/ML injection pen Inject 38 Units into the skin every morning Titrate by 2 units every 3 days to fasting BG goal of  mg/dL. Max daily Dose: 80 units. 39 mL 11    NOVOLOG FLEXPEN 100 UNIT/ML injection pen Inject 6 Units into the skin in the morning and 6 Units at noon and 6 Units in the evening. Inject before meals. Add correction 1 for 50 above 150 mg/dL. Max Daily Dose: 50 units. E10.65. Talbott Chime 45 mL 3    BD PEN NEEDLE KRISTOPHER 2ND GEN 32G X 4 MM MISC 1 each by Does not apply route daily 300 each 11    FREESTYLE LITE strip 1 each by In Vitro route daily Check BG 4 times daily.  E10.65 200 each 11    lovastatin (MEVACOR) 20 MG tablet Take 1 tablet by mouth nightly 90 tablet 11    Lancets MISC 1 Package by Other route 4 times daily      amLODIPine (NORVASC) 10 MG tablet Take 10 mg by mouth daily      cyclobenzaprine (FLEXERIL) 5 MG tablet Take 5 mg by mouth as needed      Glucagon (GVOKE HYPOPEN 2-PACK) 1 MG/0.2ML SOAJ Inject 1 mg into the skin as needed      glucagon 1 MG injection 1 mg as needed      lisinopril (PRINIVIL;ZESTRIL) 40 MG tablet TAKE 1 TABLET BY MOUTH EVERY DAY       No current facility-administered medications for this visit. [address] Esquivel NP    On this date 8/12/2022 I have spent 45 minutes reviewing previous notes, test results and face to face with the patient discussing the diagnosis and importance of compliance with the treatment plan as well as documenting on the day of the visit. Portions of this note were generated with the assistance of voice recognition software. As such, some errors in transcription may be present.

## 2022-08-15 ENCOUNTER — TELEPHONE (OUTPATIENT)
Dept: ENDOCRINOLOGY | Age: 62
End: 2022-08-15

## 2022-08-15 ENCOUNTER — TELEPHONE (OUTPATIENT)
Dept: DIABETES SERVICES | Age: 62
End: 2022-08-15

## 2022-08-15 NOTE — TELEPHONE ENCOUNTER
----- Message from [address], APRN - NP sent at 8/15/2022  8:36 AM EDT -----  Brodie Sanches - please assist with Cornelius Couch 2 PA.  Thanks, Markel Escalante

## 2022-08-15 NOTE — TELEPHONE ENCOUNTER
Pt's order for the Libe 2 has been sent to a DME company ([address] [place] [address]) via vWise with pt's last office notes.

## 2022-08-25 ENCOUNTER — TELEPHONE (OUTPATIENT)
Dept: ENDOCRINOLOGY | Age: 62
End: 2022-08-25

## 2022-08-25 ENCOUNTER — TELEPHONE (OUTPATIENT)
Dept: DIABETES SERVICES | Age: 62
End: 2022-08-25

## 2022-08-25 NOTE — TELEPHONE ENCOUNTER
Pt LVM stating she is needing the number to the diabetic educator because they called her and she got he message but the number was unclear on her machine. Pt contacted-Given the number to diabetes education is [phone]. Pt verbalized understanding and had no further questions.

## 2022-08-25 NOTE — TELEPHONE ENCOUNTER
Patient called us back and has received her Tyrone Macdonald and ready to schedule.   Schedule for 9-7-2022 at 1:30 PM

## 2022-09-06 RX ORDER — AMLODIPINE BESYLATE 10 MG/1
TABLET ORAL
Qty: 30 TABLET | Refills: 3 | OUTPATIENT
Start: 2022-09-06

## 2022-09-07 ENCOUNTER — HOSPITAL ENCOUNTER (OUTPATIENT)
Dept: DIABETES SERVICES | Age: 62
Setting detail: RECURRING SERIES
Discharge: HOME | End: 2022-09-10

## 2022-09-07 NOTE — PROGRESS NOTES
his is a one on one appointment. Due to being during OPMWM-78 public health emergency, social distancing and mandatory precautions are in place and utilized Pt seen today for initial insertion of  FreeStyle Castillo 2  system. Pt instructed on overview of continuous glucose monitoring (CGM) device and use. Instructed on sensor, sensor applicator and . Instructed on alarms or alerts with Castillo system. Scan the sensor that is on the arm for readings. Must scan sensor minimal every eight hours. If do not scan in the eight hour time frame, data during that eight hour period will be lost.  Can print reports of blood sugars in graph form. Instructed on storage, use, prep, insertion and removal of sensor and system  Instructed on interfering substances and x-rays. Remove the sensor before MRI, CT scan, X-ray, or diathermy treatment. Instructed on troubleshooting, alerts, alarms, communication between sensor and ,  starting sensor session, ending sensor session, and site rotation. Instructed only approved site is the back of the arm. Instructed on Vitamin C and Asprin use. Possible false high if over 500 mg Vitamin C or false low with over 650 mg of Asprin   All questions and concerns addressed. Provided [facility] technical support phone number. [phone]  Discussed Diabetes Education. She has not had DME on Medicare. She will think about it after her MRI on the 13th of September. Instructed to juct call us or Swapna Tran to let her know decision about education. Medicine Reconciliation Completed. No surgery or procedures reported.

## 2022-09-16 ENCOUNTER — TELEPHONE (OUTPATIENT)
Dept: ENDOCRINOLOGY | Age: 62
End: 2022-09-16

## 2022-09-16 DIAGNOSIS — E05.90 HYPERTHYROIDISM: Primary | ICD-10-CM

## 2022-09-16 NOTE — TELEPHONE ENCOUNTER
1. Hyperthyroidism  - Thyroid labs ordered. - We will repeat in next 6 weeks. - T4, Free; Future  - T3, Free; Future  - TSH; Future  - TSH;  Future  - T4, Free; Future  - T3, Free; Future

## 2022-09-16 NOTE — TELEPHONE ENCOUNTER
"-- DO NOT REPLY / DO NOT REPLY ALL --  -- Message is from the [employer]--    General Patient Message      Reason for Call: [facility] is looking to get medical clearance for the patient, she is having cataract surgery on November 24th. Please fax those over to [phone], Dr. Lissa Gosselin Phone    11/17/2021 02:22 PM CST Phone (Incoming) Altagracia Weeks [phone]     [facility] AT [place]          Alternative phone number: n/a    Turnaround time given to caller: ""This message will be sent to [facility] Provider's name]. The clinical team will fulfill your request as soon as they review your message. \""    " Pt called and stated she has been to the ER 2 times this week (Tuesday, Thursday). Pt stated the ER advised her she has low thyroid levels and need to be treated. Pt stated she is currently leaving her PCP and was advised to get medication for low Thyroid levels. Pt mentioned her blood pressure is up and down and PCP advised her until she start treatment for the low thyroid levels, she may continue to have that problem. Pt is requesting medication go to [facility] in [place].

## 2022-09-16 NOTE — TELEPHONE ENCOUNTER
Pt was contacted and advised she must complete a set of labs as soon as possible, and repeat in 6 weeks. Pt stated she will go to [facility] right now to complete labs. I advised pt if she needs me to fax her orders to them to call me and let me know. Pt verbalized understanding and stated to mail the order for the repeat labs. Lab order has been mailed to pt.

## 2022-09-19 ENCOUNTER — TELEPHONE (OUTPATIENT)
Dept: ENDOCRINOLOGY | Age: 62
End: 2022-09-19

## 2022-09-19 NOTE — TELEPHONE ENCOUNTER
Patient left a message requesting her lab results and decision concerning her labs she had done at Noemí's request.

## 2022-09-20 ENCOUNTER — OFFICE VISIT (OUTPATIENT)
Dept: ENDOCRINOLOGY | Age: 62
End: 2022-09-20
Payer: MEDICARE

## 2022-09-20 VITALS
WEIGHT: 152 LBS | HEIGHT: 69 IN | HEART RATE: 127 BPM | DIASTOLIC BLOOD PRESSURE: 80 MMHG | SYSTOLIC BLOOD PRESSURE: 151 MMHG | BODY MASS INDEX: 22.51 KG/M2

## 2022-09-20 DIAGNOSIS — E10.65 TYPE 1 DIABETES MELLITUS WITH HYPERGLYCEMIA (HCC): Primary | ICD-10-CM

## 2022-09-20 DIAGNOSIS — Z91.14 NONCOMPLIANCE WITH MEDICATION REGIMEN: ICD-10-CM

## 2022-09-20 DIAGNOSIS — I10 ESSENTIAL HYPERTENSION: ICD-10-CM

## 2022-09-20 PROBLEM — Z91.148 NONCOMPLIANCE WITH MEDICATION REGIMEN: Status: ACTIVE | Noted: 2022-09-20

## 2022-09-20 LAB — HBA1C MFR BLD: 11.9 %

## 2022-09-20 PROCEDURE — 3017F COLORECTAL CA SCREEN DOC REV: CPT | Performed by: DIETITIAN, REGISTERED

## 2022-09-20 PROCEDURE — 1036F TOBACCO NON-USER: CPT | Performed by: DIETITIAN, REGISTERED

## 2022-09-20 PROCEDURE — 3046F HEMOGLOBIN A1C LEVEL >9.0%: CPT | Performed by: DIETITIAN, REGISTERED

## 2022-09-20 PROCEDURE — G8427 DOCREV CUR MEDS BY ELIG CLIN: HCPCS | Performed by: DIETITIAN, REGISTERED

## 2022-09-20 PROCEDURE — 95251 CONT GLUC MNTR ANALYSIS I&R: CPT | Performed by: DIETITIAN, REGISTERED

## 2022-09-20 PROCEDURE — 83036 HEMOGLOBIN GLYCOSYLATED A1C: CPT | Performed by: DIETITIAN, REGISTERED

## 2022-09-20 PROCEDURE — G8420 CALC BMI NORM PARAMETERS: HCPCS | Performed by: DIETITIAN, REGISTERED

## 2022-09-20 PROCEDURE — 2022F DILAT RTA XM EVC RTNOPTHY: CPT | Performed by: DIETITIAN, REGISTERED

## 2022-09-20 PROCEDURE — 99215 OFFICE O/P EST HI 40 MIN: CPT | Performed by: DIETITIAN, REGISTERED

## 2022-09-20 RX ORDER — FLASH GLUCOSE SENSOR
2 KIT MISCELLANEOUS
Qty: 1 EACH | Refills: 3 | Status: SHIPPED | OUTPATIENT
Start: 2022-09-20

## 2022-09-20 RX ORDER — LISINOPRIL 40 MG/1
40 TABLET ORAL DAILY
Qty: 30 TABLET | Refills: 11 | Status: SHIPPED | OUTPATIENT
Start: 2022-09-20

## 2022-09-20 RX ORDER — METOPROLOL SUCCINATE 25 MG/1
25 TABLET, EXTENDED RELEASE ORAL 2 TIMES DAILY
Qty: 30 TABLET | Refills: 11 | Status: SHIPPED | OUTPATIENT
Start: 2022-09-20 | End: 2022-09-21 | Stop reason: SDUPTHER

## 2022-09-20 RX ORDER — AMLODIPINE BESYLATE 10 MG/1
10 TABLET ORAL DAILY
Qty: 30 TABLET | Refills: 11 | Status: SHIPPED | OUTPATIENT
Start: 2022-09-20

## 2022-09-20 RX ORDER — BLOOD-GLUCOSE SENSOR
EACH MISCELLANEOUS
Qty: 9 EACH | Refills: 3
Start: 2022-09-20 | End: 2022-09-20 | Stop reason: ALTCHOICE

## 2022-09-20 RX ORDER — INSULIN ASPART 100 [IU]/ML
6 INJECTION, SOLUTION INTRAVENOUS; SUBCUTANEOUS
Qty: 45 ML | Refills: 3
Start: 2022-09-20

## 2022-09-20 RX ORDER — BLOOD-GLUCOSE TRANSMITTER
EACH MISCELLANEOUS
Qty: 1 EACH | Refills: 3
Start: 2022-09-20 | End: 2022-09-20 | Stop reason: ALTCHOICE

## 2022-09-20 RX ORDER — INSULIN GLARGINE 100 [IU]/ML
20 INJECTION, SOLUTION SUBCUTANEOUS 2 TIMES DAILY
Qty: 39 ML | Refills: 11
Start: 2022-09-20

## 2022-09-20 ASSESSMENT — ENCOUNTER SYMPTOMS
SHORTNESS OF BREATH: 1
ABDOMINAL PAIN: 0
BACK PAIN: 0
CONSTIPATION: 0
DIARRHEA: 0
COUGH: 0
WHEEZING: 0
VOICE CHANGE: 0
VOMITING: 0
NAUSEA: 0
TROUBLE SWALLOWING: 0

## 2022-09-20 NOTE — PROGRESS NOTES
[facility] ENDOCRINOLOGY   AND   THYROID NODULE CLINIC    Silvino RupeshGIANNI blue  [address], [address] [facility], [address] [facility]  Phone [phone]  Facsimile [phone]      Reason for visit: Juan Alberto ATKINS (1960) is here for follow up of Type 1 Diabetes Mellitus. ASSESSMENT AND PLAN:    Interpretation of 72 hour glucose monitor: At least 72 hours of data were reviewed. The patient utilizes a dexcom G6 continuous glucose monitoring system. The average glucose during the reviewed timeframe was 313 (time in range 3%, hyperglycemia 97%, hypoglycemia 0%). There is a pattern of constant elevated hyperglycemia. 1. Type 1 diabetes mellitus with hyperglycemia (HCC)  A1C is 12.3%. Glycemic control is suboptimal. Patient recently hospitalized due to fluctuating Blood pressure. Patient has a history of lack of compliance with administering her insulin and taking her medications as prescribed. She says she was told in the hospital that her thyroid was low and needed to be treated. However, most recent bloodwork shows thyroid function is within normal limits. Instructed patient her thyroid can sometimes be lower with illness. The biggest concern for this patient is long history of hyperglycemia uncontrolled and uncontrolled BP at this time. --- Instructed patient to follow the insulin protocol below. --- Recheck Thyroid function in 3 months. - AMB POC HEMOGLOBIN A1C  - LANTUS SOLOSTAR 100 UNIT/ML injection pen; Inject 20 Units into the skin 2 times daily Titrate by 2 units every 3 days to fasting BG goal of  mg/dL. Max daily Dose: 80 units. Dispense: 39 mL; Refill: 11  - NOVOLOG FLEXPEN 100 UNIT/ML injection pen; Inject 6 Units into the skin 3 times daily (before meals) Add correction 1 for 25 above 150 mg/dL. Max Daily Dose: 50 units. E10.65. Dispense: 45 mL; Refill: 3  - T4, Free; Future  - T3, Free; Future  - TSH; Future  - Comprehensive Metabolic Panel;  Future  - GLUCOSE MONITOR, 72 HOUR, PHYS INTERP  - Continuous Blood Gluc Sensor (DEXCOM G6 SENSOR) MISC; Change every 10 days as directed. Dispense: 9 each; Refill: 3  - Continuous Blood Gluc Transmit (DEXCOM G6 TRANSMITTER) MISC; Change every 90 days as directed. Dispense: 1 each; Refill: 3  - [facility]    2. Essential hypertension  BP is elevated. Start metoprolol 25 mg 2 times daily. Medication compliance questionable. - metoprolol succinate (TOPROL XL) 25 MG extended release tablet; Take 1 tablet by mouth 2 times daily  Dispense: 30 tablet; Refill: 11  - amLODIPine (NORVASC) 10 MG tablet; Take 1 tablet by mouth daily  Dispense: 30 tablet; Refill: 11  - lisinopril (PRINIVIL;ZESTRIL) 40 MG tablet; Take 1 tablet by mouth daily TAKE 1 TABLET BY MOUTH EVERY DAY  Dispense: 30 tablet; Refill: 11  BP Readings from Last 3 Encounters:   09/20/22 (!) 151/80   08/12/22 128/88   06/01/22 (!) 142/80     2. Hypercholesterolemia  Last  above goal of less than 70. Patient taking lovastatin 20 mg at night. Recheck lipid panel.  - Lipid Panel; Future - previously ordered- not complete.  - lovastatin (MEVACOR) 20 MG tablet; Take 1 tablet by mouth nightly  Dispense: 90 tablet; Refill: 11     4. Albuminuria. Patient is taking ACE inhibitor BP is controlled. Patient will benefit A1c reduction to goal of 7%. 5. Noncompliance with medication regimen  - [facility]       Follow-up and Dispositions    Return in about 1 month (around 9/12/2022). Tests or Results Reviewed: (see lab dates below in note) HgbA1C, Cr, GFR, Microalbumin/Cr ratio, Lipid Profile, TSH. History of Present Illness:        Current Diabetes Medications: Lantus 32 units in the morning,  Novolog - 8 units with breakfast, 4-6 units with Lunch,        Followed by [address] [facility]      Referred by YESI LudwigP, PCP.  Patient has been using a Dexcom G6, but it malfunctioned last month with high and low readings that were verified by glucometer as inaccurate. Patient reports concern that meter did not work so she took it off. She  says she does not want to revisit the possiblity of a new CGM because she is fearful of 'lows' and thinks her CGM will not read accurately. Unexplained weight loss in past 2 months. Patient fears it is cancer- explained likely due to hyperglycemia. Patient will be going to cardiology next month to establish as a new patient due to recent and intermittent heart palpitations with physical  activity and left-sided muscular pain upper chest region. Patient is keeping blood sugar records but checking blood sugar 4 times a day of which to times is 2 hours after meals and dosing NovoLog based on postprandial hyperglycemia with a correction scale. Goal - keep BG at 150. Date of DM diagnosis: 2010, went to doctor and diagnosed. Current Diabetes Medications: Lantus 30 units in 12 noon. Novolog 12 units B, 8 units L, 8 units D. Correction scale: 2 units  for BG >250 if >300  Takes 3 units novolog. Medication Failures: none       Current symptoms: See Review of Systems       Neuropathy: no s/s. Nephropathy: Stage 3 Kidney Disease   02/18/2020      microalbumin/Cr ratio    05/10/2021      microalbumin/Cr ratio >300   09/29/2021      Cr 1.02, GFR 69,    10/04/2021      Cr 1.34, GFR 49   12/14/2021      Cr 0.94, GFR 75   02/16/2022      Cr 1.13, GFR 60, microalbumin/Cr ratio 217  08/03/2022  Cr 0.98, GFR 65, microalbumin/Cr ratio 192.5      Retinopathy: Last eye exam was 12/27/2021 and demonstrated no evidence of diabetic retinopathy. Small evidence of new onset  of cataracts. Eye care specialist is NICA KERR in [place]. Diet: morning biggest meal, grits, egg, 3 pieces flood, toast, coffee - sweet and low- \"raises blood sugar\".  Lunch - ham sandwich or cheese burger or chicken wings, steak salad. Dinner - 6 pm ham sandwich. Bedtime 10 pm  Sunday biggest meal at dinner - vegetables potato and meat. Water. Diet soda. 4-8 oz regular soda - once a week. Snacks - banana or apple. Exercise:  Walking at track 3 times a week - 15 minutes each time. Diabetes education: The patient has not received formal diabetes education. Hemoglobin A1c:   02/10/2021      HgbA1C:         8.8%   05/10/2021      HgbA1C:         9.3%   08/02/2021      HgbA1C:         9.9%   12/28/2021      HgbA1C:         9.6%  06/01/2022      12.3%        C-Peptide:   12/14/2021      <0.01   12/14/2021      Fasting glucose 226      IA-2 Antibody:   12/14/2021         >350 (<5.4)      MARIBELL-65:   12/14/2021         >250 (<5)      Home blood glucose monitoring frequency:   By review of CGM download over past 28 days  Average blood glucose 313  Time in range 97%  High 15%, Very High 82%  Low 0%, Very Low 0%     Average    Fasting 265-400    AC lunch 320-400    AC supper 228-400    Bedtime 267-400      Blood glucose levels are uncontrolled, most significant elevations are constant. Hypoglycemia: rare. Lowest 33, 2 weeks ago at 12 or 2 am,        Lipids:    05/29/2019 TC- 202, LDL- 122, VLDL- 12,  HDL- 68, TG- 59   11/18/2019  TC- 194, LDL- 113, VLDL- 15,  HDL- 66, TG- 76   12/10/2020  TC- 185, LDL- 100, VLDL- 14,  HDL- 71, TG- none   12/14/2021  TC- 196, LDL- 127, VLDL- 16,  HDL- 53, TG- 78       Thyroid:    11/18/2019      TSH     1.090   12/10/2020      TSH     0.769   02/16/2022      TSH     0.807  08/03/2022              0.351  09/13/2022  0.290  09/16/2022  0.480 (0.35-4.94)      Free T4:  09/16/2022 1.14 (0.7-1.5)       BP:                 BP Readings from Last 3 Encounters:        10/21/21  126/82         Weight Trends:                  Wt Readings from Last 3 Encounters:        10/21/21  152 lb (68.9 kg)             Medical/Surgical/Social/Family History: Reviewed in Chart Medications: Reviewed in chart       Allergies   Patient has no known allergies. BP (!) 151/80 (Site: Right Upper Arm, Position: Sitting)   Pulse (!) 127   Ht 5' 9\" (1.753 m)   Wt 152 lb (68.9 kg)   BMI 22.45 kg/m²     Weight Trends: Wt Readings from Last 3 Encounters:   09/20/22 152 lb (68.9 kg)   08/12/22 160 lb 3.2 oz (72.7 kg)   06/01/22 152 lb (68.9 kg)       Allergies and Medications: Reviewed in Chart    Review of Systems   Constitutional:  Positive for diaphoresis. Negative for appetite change, fatigue and unexpected weight change. HENT:  Negative for trouble swallowing and voice change. Eyes:  Negative for visual disturbance. Respiratory:  Positive for shortness of breath. Negative for cough and wheezing. Cardiovascular:  Positive for palpitations. Negative for chest pain and leg swelling. Gastrointestinal:  Negative for abdominal pain, constipation, diarrhea, nausea and vomiting. Endocrine: Negative for cold intolerance, heat intolerance, polydipsia, polyphagia and polyuria. Genitourinary:  Negative for difficulty urinating and frequency. Musculoskeletal:  Negative for arthralgias, back pain and myalgias. Skin:  Negative for pallor. Neurological:  Negative for dizziness, tremors, weakness, numbness and headaches. Hematological:  Negative for adenopathy. Psychiatric/Behavioral:  Negative for dysphoric mood and sleep disturbance. The patient is not nervous/anxious. Physical Exam  Constitutional:       General: She is not in acute distress. Appearance: Normal appearance. She is normal weight. She is not ill-appearing. HENT:      Head: Normocephalic. Cardiovascular:      Rate and Rhythm: Normal rate and regular rhythm. Pulses: Normal pulses. Pulmonary:      Effort: No respiratory distress. Breath sounds: Normal breath sounds. No wheezing or rhonchi. Chest:      Chest wall: No tenderness. Abdominal:      General: There is no distension. Palpations: Abdomen is soft. Tenderness: There is no abdominal tenderness. There is no guarding. Musculoskeletal:         General: No swelling, tenderness or signs of injury. Cervical back: Neck supple. No tenderness. Right lower leg: No edema. Left lower leg: No edema. Feet:      Right foot:      Skin integrity: Skin integrity normal. No ulcer. Left foot:      Skin integrity: Skin integrity normal. No ulcer. Lymphadenopathy:      Cervical: No cervical adenopathy. Skin:     General: Skin is warm and dry. Findings: No erythema or rash. Neurological:      Mental Status: She is alert. Motor: No weakness.    Psychiatric:         Mood and Affect: Mood normal.         Behavior: Behavior normal.       Orders Placed This Encounter   Procedures    GLUCOSE MONITOR, 72 HOUR, PHYS INTERP    T4, Free     Standing Status:   Future     Standing Expiration Date:   9/20/2023    T3, Free     Standing Status:   Future     Standing Expiration Date:   9/20/2023    TSH     Standing Status:   Future     Standing Expiration Date:   9/20/2023    Comprehensive Metabolic Panel     Standing Status:   Future     Standing Expiration Date:   9/20/2023    [facility] - Care Coordination/Social Work - [facility]     Referral Priority:   Routine     Referral Type:   Eval and Treat     Referral Reason:   Specialty Services Required     Number of Visits Requested:   1    AMB POC HEMOGLOBIN A1C       Current Outpatient Medications   Medication Sig Dispense Refill    metoprolol succinate (TOPROL XL) 25 MG extended release tablet Take 1 tablet by mouth 2 times daily 30 tablet 11    amLODIPine (NORVASC) 10 MG tablet Take 1 tablet by mouth daily 30 tablet 11    lisinopril (PRINIVIL;ZESTRIL) 40 MG tablet Take 1 tablet by mouth daily TAKE 1 TABLET BY MOUTH EVERY DAY 30 tablet 11    LANTUS SOLOSTAR 100 UNIT/ML injection pen Inject 20 Units into the skin 2 times daily Titrate by 2 units every 3 days to fasting BG

## 2022-09-21 DIAGNOSIS — I10 ESSENTIAL HYPERTENSION: ICD-10-CM

## 2022-09-21 RX ORDER — METOPROLOL SUCCINATE 25 MG/1
25 TABLET, EXTENDED RELEASE ORAL 2 TIMES DAILY
Qty: 60 TABLET | Refills: 11 | Status: SHIPPED | OUTPATIENT
Start: 2022-09-21

## 2022-09-21 NOTE — TELEPHONE ENCOUNTER
1. Essential hypertension  To [facility] in [place]  - metoprolol succinate (TOPROL XL) 25 MG extended release tablet; Take 1 tablet by mouth 2 times daily  Dispense: 60 tablet;  Refill: 11

## 2022-09-21 NOTE — TELEPHONE ENCOUNTER
Pt called and stated she was placed on another medication for her blood pressure and it was sent to [facility]. Pt stated [facility] will not be mailing her rx's until 10/3/22. I advised pt I could send her Metoprolol to [facility] in [place]. Pt verbalized understanding and expressed thanks.       Rx sent to provider for approval.

## 2022-09-23 ENCOUNTER — CARE COORDINATION (OUTPATIENT)
Dept: CARE COORDINATION | Facility: CLINIC | Age: 62
End: 2022-09-23

## 2022-09-23 NOTE — CARE COORDINATION
RNCM received referral from GARCÍA for A1c 11.9. Pt states she is at an appt and requests to return call to [facility] when she is able. RNCM will await call back.

## 2022-09-26 ENCOUNTER — CARE COORDINATION (OUTPATIENT)
Dept: CARE COORDINATION | Facility: CLINIC | Age: 62
End: 2022-09-26

## 2022-09-27 ENCOUNTER — CARE COORDINATION (OUTPATIENT)
Dept: CARE COORDINATION | Facility: CLINIC | Age: 62
End: 2022-09-27

## 2022-09-27 NOTE — CARE COORDINATION
RNCM spoke briefly w/ pt, shared role as RNCM and attempted to share services, however pt stated \"don't need it thank you\" and ended call. Please reconsult RNCM if pt is interested in receiving services.

## 2022-10-13 NOTE — RESULT ENCOUNTER NOTE
Thyroid function and kidney function are normal. Blood sugars are very high and dangerous. Please let us know how we can help you stay on target with taking your insulin.

## 2022-10-14 NOTE — TELEPHONE ENCOUNTER
Patient left a message stating she needs a refill on [facility] Zoraida but is not leaving name of pharmacy. Left message for patient to call back for pharmacy information.

## 2022-10-25 NOTE — TELEPHONE ENCOUNTER
I called the patient back and left a message that we need what pharmacy she wants the [facility] sent to.

## 2022-12-06 DIAGNOSIS — I10 ESSENTIAL HYPERTENSION: ICD-10-CM

## 2022-12-07 RX ORDER — AMLODIPINE BESYLATE 10 MG/1
TABLET ORAL
Qty: 30 TABLET | Refills: 3 | OUTPATIENT
Start: 2022-12-07

## 2022-12-20 ENCOUNTER — OFFICE VISIT (OUTPATIENT)
Dept: ENDOCRINOLOGY | Age: 62
End: 2022-12-20
Payer: MEDICARE

## 2022-12-20 VITALS
SYSTOLIC BLOOD PRESSURE: 150 MMHG | BODY MASS INDEX: 23.78 KG/M2 | HEART RATE: 87 BPM | DIASTOLIC BLOOD PRESSURE: 90 MMHG | OXYGEN SATURATION: 99 % | WEIGHT: 161 LBS

## 2022-12-20 DIAGNOSIS — F40.9 PHOBIA, UNSPECIFIED TYPE: ICD-10-CM

## 2022-12-20 DIAGNOSIS — E10.65 HYPERGLYCEMIA DUE TO TYPE 1 DIABETES MELLITUS (HCC): ICD-10-CM

## 2022-12-20 DIAGNOSIS — E10.65 TYPE 1 DIABETES MELLITUS WITH HYPERGLYCEMIA (HCC): Primary | ICD-10-CM

## 2022-12-20 DIAGNOSIS — I10 ESSENTIAL HYPERTENSION: ICD-10-CM

## 2022-12-20 LAB — HBA1C MFR BLD: 12.3 %

## 2022-12-20 PROCEDURE — G8420 CALC BMI NORM PARAMETERS: HCPCS | Performed by: DIETITIAN, REGISTERED

## 2022-12-20 PROCEDURE — 3017F COLORECTAL CA SCREEN DOC REV: CPT | Performed by: DIETITIAN, REGISTERED

## 2022-12-20 PROCEDURE — 99214 OFFICE O/P EST MOD 30 MIN: CPT | Performed by: DIETITIAN, REGISTERED

## 2022-12-20 PROCEDURE — 95251 CONT GLUC MNTR ANALYSIS I&R: CPT | Performed by: DIETITIAN, REGISTERED

## 2022-12-20 PROCEDURE — 3046F HEMOGLOBIN A1C LEVEL >9.0%: CPT | Performed by: DIETITIAN, REGISTERED

## 2022-12-20 PROCEDURE — 83036 HEMOGLOBIN GLYCOSYLATED A1C: CPT | Performed by: DIETITIAN, REGISTERED

## 2022-12-20 PROCEDURE — G8484 FLU IMMUNIZE NO ADMIN: HCPCS | Performed by: DIETITIAN, REGISTERED

## 2022-12-20 PROCEDURE — 3079F DIAST BP 80-89 MM HG: CPT | Performed by: DIETITIAN, REGISTERED

## 2022-12-20 PROCEDURE — 1036F TOBACCO NON-USER: CPT | Performed by: DIETITIAN, REGISTERED

## 2022-12-20 PROCEDURE — G8427 DOCREV CUR MEDS BY ELIG CLIN: HCPCS | Performed by: DIETITIAN, REGISTERED

## 2022-12-20 PROCEDURE — 2022F DILAT RTA XM EVC RTNOPTHY: CPT | Performed by: DIETITIAN, REGISTERED

## 2022-12-20 PROCEDURE — 3074F SYST BP LT 130 MM HG: CPT | Performed by: DIETITIAN, REGISTERED

## 2022-12-20 RX ORDER — INSULIN GLARGINE 100 [IU]/ML
34 INJECTION, SOLUTION SUBCUTANEOUS 2 TIMES DAILY
Qty: 39 ML | Refills: 11 | Status: SHIPPED | OUTPATIENT
Start: 2022-12-20 | End: 2022-12-23 | Stop reason: SDUPTHER

## 2022-12-20 RX ORDER — FLASH GLUCOSE SENSOR
2 KIT MISCELLANEOUS
Qty: 1 EACH | Refills: 3
Start: 2022-12-20

## 2022-12-20 RX ORDER — PEN NEEDLE, DIABETIC 32GX 5/32"
1 NEEDLE, DISPOSABLE MISCELLANEOUS DAILY
Qty: 200 EACH | Refills: 11 | Status: SHIPPED | OUTPATIENT
Start: 2022-12-20 | End: 2022-12-23 | Stop reason: SDUPTHER

## 2022-12-20 RX ORDER — LOVASTATIN 20 MG/1
20 TABLET ORAL NIGHTLY
Qty: 90 TABLET | Refills: 11 | Status: SHIPPED | OUTPATIENT
Start: 2022-12-20

## 2022-12-20 RX ORDER — ONDANSETRON 4 MG/1
TABLET, ORALLY DISINTEGRATING ORAL
COMMUNITY
Start: 2022-11-29

## 2022-12-20 RX ORDER — AMLODIPINE BESYLATE 10 MG/1
10 TABLET ORAL DAILY
Qty: 30 TABLET | Refills: 11 | Status: SHIPPED | OUTPATIENT
Start: 2022-12-20

## 2022-12-20 RX ORDER — METOPROLOL SUCCINATE 25 MG/1
25 TABLET, EXTENDED RELEASE ORAL 2 TIMES DAILY
Qty: 60 TABLET | Refills: 11 | Status: SHIPPED | OUTPATIENT
Start: 2022-12-20

## 2022-12-20 RX ORDER — LISINOPRIL 40 MG/1
40 TABLET ORAL DAILY
Qty: 30 TABLET | Refills: 11 | Status: SHIPPED | OUTPATIENT
Start: 2022-12-20

## 2022-12-20 RX ORDER — INSULIN ASPART 100 [IU]/ML
6 INJECTION, SOLUTION INTRAVENOUS; SUBCUTANEOUS
Qty: 45 ML | Refills: 11 | Status: SHIPPED | OUTPATIENT
Start: 2022-12-20 | End: 2022-12-23 | Stop reason: SDUPTHER

## 2022-12-20 ASSESSMENT — ENCOUNTER SYMPTOMS
ABDOMINAL PAIN: 0
DIARRHEA: 0
NAUSEA: 1
WHEEZING: 0
TROUBLE SWALLOWING: 0
VOICE CHANGE: 0
CONSTIPATION: 0
SHORTNESS OF BREATH: 0
BACK PAIN: 0
COUGH: 0
VOMITING: 0

## 2022-12-20 NOTE — PROGRESS NOTES
[facility] ENDOCRINOLOGY   AND   THYROID NODULE CLINIC    GIANNI Jiménez  [address], [address] [address]  Phone [phone]  Facsimile [phone]      Reason for visit: Prince ATKINS (1960) is here for follow up of Type 1 Diabetes Mellitus. ASSESSMENT AND PLAN:    Interpretation of 72 hour glucose monitor: At least 72 hours of data were reviewed. The patient utilizes a dexcom G6 continuous glucose monitoring system. The average glucose during the reviewed timeframe was 313 (time in range 3%, hyperglycemia 97%, hypoglycemia 0%). There is a pattern of constant elevated hyperglycemia. 1. Type 1 diabetes mellitus with hyperglycemia (HCC)  A1C is 12.3%. Glycemic control is suboptimal. Patient checks BG 17-20 or more times daily. Patient is hyperfocused on her BG readings with a serious fear of hypoglycemia. Patient is afraid of having low blood sugars. It is undetermined if patient is taking her insulin correctly. Her blood sugars range from 180 all the way up to 400 daily. She reports she is taking her insulin. However with blood sugars this high, 1 would likely question compliance. I think it would be valuable for this patient to go to counseling to discuss her concerns. - AMB POC HEMOGLOBIN A1C  - Continuous Blood Gluc Sensor (FREESTYLE KAREN 2 SENSOR) MISC; 2 each by Does not apply route 4 times daily (before meals and nightly)  Dispense: 1 each; Refill: 3  - LANTUS SOLOSTAR 100 UNIT/ML injection pen; Inject 34 Units into the skin 2 times daily Titrate by 2 units every 3 days to fasting BG goal of  mg/dL. Max daily Dose: 80 units. Dispense: 39 mL; Refill: 11  - NOVOLOG FLEXPEN 100 UNIT/ML injection pen; Inject 6 Units into the skin 3 times daily (before meals) Add correction 1 for 50 above 150 mg/dL. Max Daily Dose: 50 units. E10.65.   Dispense: 45 mL; Refill: 11  - GLUCOSE MONITOR, 72 HOUR, PHYS INTERP  - External Referral To Counseling Services    2. Essential hypertension  BP elevated. Patient states she has no refills. All medications refilled today. BP Readings from Last 3 Encounters:   12/20/22 (!) 150/90   09/20/22 (!) 151/80   08/12/22 128/88   - amLODIPine (NORVASC) 10 MG tablet; Take 1 tablet by mouth daily  Dispense: 30 tablet; Refill: 11  - lisinopril (PRINIVIL;ZESTRIL) 40 MG tablet; Take 1 tablet by mouth daily TAKE 1 TABLET BY MOUTH EVERY DAY  Dispense: 30 tablet; Refill: 11  - metoprolol succinate (TOPROL XL) 25 MG extended release tablet; Take 1 tablet by mouth 2 times daily  Dispense: 60 tablet; Refill: 11    3. Hypercholesterolemia  Last  above goal of less than 70. Patient taking lovastatin 20 mg at night. Recheck lipid panel.  - Lipid Panel; Future - previously ordered- not complete.  - lovastatin (MEVACOR) 20 MG tablet; Take 1 tablet by mouth nightly  Dispense: 90 tablet; Refill: 11    4. Albuminuria. Patient will benefit A1c reduction to goal of 7%. 5. Noncompliance with medication regimen  Previous referral placed. - [facility] Coordination/Social Work - [facility] [place]    6. Phobia, unspecified type  --- Recommend assistance with evaluation for excessive fear of hypoglycemia.  - [facility] (Psychiatry)      Tests or Results Reviewed: (see lab dates below in note) HgbA1C, Cr, GFR, Microalbumin/Cr ratio, Lipid Profile, TSH. History of Present Illness:        Current Diabetes Medications: Lantus 32 units in the morning,  Novolog - 8 units with breakfast, 4-6 units with Lunch,        Followed by [address] Nephrology - [facility]      Referred by PARVEZ Paulino, PCP. Patient has been using a Dexcom G6, but it malfunctioned last month with high and low readings that were verified by glucometer as inaccurate. Patient reports concern that meter did not work so she took it off.  She  says she does not want to revisit the possiblity of a new CGM because she is fearful of 'lows' and thinks her CGM will not read accurately. Unexplained weight loss in past 2 months. Patient fears it is cancer- explained likely due to hyperglycemia. Patient will be going to cardiology next month to establish as a new patient due to recent and intermittent heart palpitations with physical  activity and left-sided muscular pain upper chest region. Patient is keeping blood sugar records but checking blood sugar 4 times a day of which to times is 2 hours after meals and dosing NovoLog based on postprandial hyperglycemia with a correction scale. Goal - keep BG at 150. Date of DM diagnosis: 2010, went to doctor and diagnosed. Current Diabetes Medications: Lantus 32 units in 12 noon. Novolog 6 units AC TID. Correction scale: 2 units  for BG >250 if >300  Takes 3 units novolog. Medication Failures: none       Current symptoms: See Review of Systems       Neuropathy: no s/s. Nephropathy: Stage 3 Kidney Disease   02/18/2020      microalbumin/Cr ratio    05/10/2021      microalbumin/Cr ratio >300   09/29/2021      Cr 1.02, GFR 69,    10/04/2021      Cr 1.34, GFR 49   12/14/2021      Cr 0.94, GFR 75   02/16/2022      Cr 1.13, GFR 60, microalbumin/Cr ratio 217  08/03/2022  Cr 0.98, GFR 65, microalbumin/Cr ratio 192.5   10/12/2022 Cr 1.02, GFR 62     Retinopathy: Last eye exam was 12/27/2021 and demonstrated no evidence of diabetic retinopathy. Small evidence of new onset  of cataracts. Eye care specialist is [facility] CIRILO in [place]. Diet: morning biggest meal, grits, egg, 3 pieces flood, toast, coffee - sweet and low- \"raises blood sugar\". Lunch - ham sandwich or  cheese burger or chicken wings, steak salad. Dinner - 6 pm ham sandwich. Bedtime 10 pm  Sunday biggest meal at dinner - vegetables potato and meat. Water. Diet soda. 4-8 oz regular soda - once a week. Snacks - banana or apple.        Exercise:  Walking at track 3 times a week - 15 minutes each time. Diabetes education: The patient has not received formal diabetes education. Hemoglobin A1c:   02/10/2021      HgbA1C:         8.8%   05/10/2021      HgbA1C:         9.3%   08/02/2021      HgbA1C:         9.9%   12/28/2021      HgbA1C:         9.6%  06/01/2022      12.3%    10/12/2022      12.3%      C-Peptide:   12/14/2021      <0.01   12/14/2021      Fasting glucose 226      IA-2 Antibody:   12/14/2021         >350 (<5.4)      MARIBELL-65:   12/14/2021         >250 (<5)      Home blood glucose monitoring frequency:   By review of CGM download over past 28 days  Average blood glucose 313  Time in range 97%  High 15%, Very High 82%  Low 0%, Very Low 0%     Average    Fasting 265-400    AC lunch 320-400    AC supper 228-400    Bedtime 267-400      Blood glucose levels are uncontrolled, most significant elevations are constant. Hypoglycemia: rare. Lowest 33, 2 weeks ago at 12 or 2 am,        Lipids:    05/29/2019 TC- 202, LDL- 122, VLDL- 12,  HDL- 68, TG- 59   11/18/2019  TC- 194, LDL- 113, VLDL- 15,  HDL- 66, TG- 76   12/10/2020  TC- 185, LDL- 100, VLDL- 14,  HDL- 71, TG- none   12/14/2021  TC- 196, LDL- 127, VLDL- 16,  HDL- 53, TG- 78       Thyroid:    11/18/2019      TSH     1.090   12/10/2020      TSH     0.769   02/16/2022      TSH     0.807  08/03/2022              0.351  09/13/2022  0.290  09/16/2022  0.480 (0.35-4.94)  10/12/2022     TSH     0.385, Free T4  1.13,      Free T4:  09/16/2022 1.14 (0.7-1.5)       BP:                 BP Readings from Last 3 Encounters:        10/21/21  126/82         Weight Trends: Wt Readings from Last 3 Encounters:        10/21/21  152 lb (68.9 kg)             Medical/Surgical/Social/Family History: Reviewed in Chart       Medications: Reviewed in chart       Allergies   Patient has no known allergies. BP (!) 150/90   Pulse 87   Wt 161 lb (73 kg)   SpO2 99%   BMI 23.78 kg/m²     Weight Trends:   Wt Readings from Last 3 Encounters: 12/20/22 161 lb (73 kg)   09/20/22 152 lb (68.9 kg)   08/12/22 160 lb 3.2 oz (72.7 kg)       Allergies and Medications: Reviewed in Chart    Review of Systems   Constitutional:  Positive for diaphoresis. Negative for appetite change, fatigue and unexpected weight change. HENT:  Negative for trouble swallowing and voice change. Eyes:  Negative for visual disturbance. Respiratory:  Negative for cough, shortness of breath and wheezing. Cardiovascular:  Positive for palpitations. Negative for chest pain and leg swelling. Gastrointestinal:  Positive for nausea (occasional nause). Negative for abdominal pain, constipation, diarrhea and vomiting. Endocrine: Negative for cold intolerance, heat intolerance, polydipsia, polyphagia and polyuria. Genitourinary:  Negative for difficulty urinating and frequency. Musculoskeletal:  Negative for arthralgias, back pain and myalgias. Skin:  Negative for pallor. Neurological:  Negative for dizziness, tremors, weakness, numbness and headaches. Hematological:  Negative for adenopathy. Psychiatric/Behavioral:  Negative for dysphoric mood and sleep disturbance. The patient is not nervous/anxious. Physical Exam  Constitutional:       General: She is not in acute distress. Appearance: Normal appearance. She is normal weight. She is not ill-appearing. HENT:      Head: Normocephalic. Cardiovascular:      Rate and Rhythm: Normal rate and regular rhythm. Pulses: Normal pulses. Pulmonary:      Effort: No respiratory distress. Breath sounds: Normal breath sounds. No wheezing or rhonchi. Chest:      Chest wall: No tenderness. Abdominal:      General: There is no distension. Palpations: Abdomen is soft. Tenderness: There is no abdominal tenderness. There is no guarding. Musculoskeletal:         General: No swelling, tenderness or signs of injury. Cervical back: Neck supple. No tenderness. Right lower leg: No edema. Left lower leg: No edema. Feet:      Right foot:      Skin integrity: Skin integrity normal. No ulcer. Left foot:      Skin integrity: Skin integrity normal. No ulcer. Lymphadenopathy:      Cervical: No cervical adenopathy. Skin:     General: Skin is warm and dry. Findings: No erythema or rash. Neurological:      Mental Status: She is alert. Motor: No weakness. Psychiatric:         Mood and Affect: Mood normal.         Behavior: Behavior normal.       Orders Placed This Encounter   Procedures    GLUCOSE MONITOR, 72 HOUR, PHYS INTER[address] (Psychiatry)     Referral Priority:   Routine     Referral Type:   Eval and Treat     Referral Reason:   Specialty Services Required     Requested Specialty:   Psychiatry     Number of Visits Requested:   1    AMB POC HEMOGLOBIN A1C       Current Outpatient Medications   Medication Sig Dispense Refill    ondansetron (ZOFRAN-ODT) 4 MG disintegrating tablet       amLODIPine (NORVASC) 10 MG tablet Take 1 tablet by mouth daily 30 tablet 11    lisinopril (PRINIVIL;ZESTRIL) 40 MG tablet Take 1 tablet by mouth daily TAKE 1 TABLET BY MOUTH EVERY DAY 30 tablet 11    metoprolol succinate (TOPROL XL) 25 MG extended release tablet Take 1 tablet by mouth 2 times daily 60 tablet 11    Continuous Blood Gluc Sensor (FREESTYLE KAREN 2 SENSOR) MISC 2 each by Does not apply route 4 times daily (before meals and nightly) 1 each 3    lovastatin (MEVACOR) 20 MG tablet Take 1 tablet by mouth nightly 90 tablet 11    LANTUS SOLOSTAR 100 UNIT/ML injection pen Inject 34 Units into the skin 2 times daily Titrate by 2 units every 3 days to fasting BG goal of  mg/dL. Max daily Dose: 80 units. 39 mL 11    NOVOLOG FLEXPEN 100 UNIT/ML injection pen Inject 6 Units into the skin 3 times daily (before meals) Add correction 1 for 50 above 150 mg/dL. Max Daily Dose: 50 units. E10.65.  45 mL 11    BD PEN NEEDLE KRISTOPHER 2ND GEN 32G X 4 MM MISC 1 each by Does not apply route daily Inject 4 times daily 200 each 11    FREESTYLE LITE strip 1 each by In Vitro route daily Check BG 4 times daily. E10.65 200 each 11    Lancets MISC 1 Package by Other route 4 times daily      cyclobenzaprine (FLEXERIL) 5 MG tablet Take 5 mg by mouth as needed      Glucagon (GVOKE HYPOPEN 2-PACK) 1 MG/0.2ML SOAJ Inject 1 mg into the skin as needed       No current facility-administered medications for this visit. JUSTINO Aguiar NP    On this date 12/20/2022 I have spent 30 minutes reviewing previous notes, test results and face to face with the patient discussing the diagnosis and importance of compliance with the treatment plan as well as documenting on the day of the visit. Portions of this note were generated with the assistance of voice recognition software. As such, some errors in transcription may be present.

## 2022-12-21 PROBLEM — F40.9 PHOBIA: Status: ACTIVE | Noted: 2022-12-21

## 2022-12-23 ENCOUNTER — TELEPHONE (OUTPATIENT)
Dept: ENDOCRINOLOGY | Age: 62
End: 2022-12-23

## 2022-12-23 DIAGNOSIS — E10.65 HYPERGLYCEMIA DUE TO TYPE 1 DIABETES MELLITUS (HCC): ICD-10-CM

## 2022-12-23 DIAGNOSIS — E10.65 TYPE 1 DIABETES MELLITUS WITH HYPERGLYCEMIA (HCC): ICD-10-CM

## 2022-12-23 RX ORDER — INSULIN GLARGINE 100 [IU]/ML
34 INJECTION, SOLUTION SUBCUTANEOUS 2 TIMES DAILY
Qty: 75 ML | Refills: 3 | Status: SHIPPED | OUTPATIENT
Start: 2022-12-23

## 2022-12-23 RX ORDER — INSULIN ASPART 100 [IU]/ML
6 INJECTION, SOLUTION INTRAVENOUS; SUBCUTANEOUS
Qty: 45 ML | Refills: 3 | Status: SHIPPED | OUTPATIENT
Start: 2022-12-23

## 2022-12-23 RX ORDER — PEN NEEDLE, DIABETIC 32GX 5/32"
1 NEEDLE, DISPOSABLE MISCELLANEOUS DAILY
Qty: 400 EACH | Refills: 3 | Status: SHIPPED | OUTPATIENT
Start: 2022-12-23

## 2022-12-23 NOTE — TELEPHONE ENCOUNTER
Patient needs her pen needles, Novolog, and [facility] They were sent to her local pharmacy instead.

## 2023-01-04 DIAGNOSIS — E10.65 TYPE 1 DIABETES MELLITUS WITH HYPERGLYCEMIA (HCC): Primary | ICD-10-CM

## 2023-01-04 RX ORDER — FLASH GLUCOSE SENSOR
KIT MISCELLANEOUS
Qty: 1 EACH | Refills: 3 | Status: SHIPPED | OUTPATIENT
Start: 2023-01-04

## 2023-01-04 NOTE — TELEPHONE ENCOUNTER
1. Type 1 diabetes mellitus with hyperglycemia (HCC)    - Continuous Blood Gluc Sensor (FREESTYLE KAREN 2 SENSOR) MISC; 2 each by Does not apply route 4 times daily (before meals and nightly)  Dispense: 1 each;  Refill: 3

## 2023-01-04 NOTE — TELEPHONE ENCOUNTER
Patient can not get her refills for her sensors since the prescription was sent as no print, so Im resending the sensors they need.

## 2023-03-28 ENCOUNTER — OFFICE VISIT (OUTPATIENT)
Dept: ENDOCRINOLOGY | Age: 63
End: 2023-03-28

## 2023-03-28 VITALS
HEIGHT: 69 IN | DIASTOLIC BLOOD PRESSURE: 70 MMHG | SYSTOLIC BLOOD PRESSURE: 130 MMHG | WEIGHT: 163 LBS | HEART RATE: 100 BPM | OXYGEN SATURATION: 100 % | BODY MASS INDEX: 24.14 KG/M2

## 2023-03-28 DIAGNOSIS — E78.00 HYPERCHOLESTEROLEMIA: ICD-10-CM

## 2023-03-28 DIAGNOSIS — E10.22 CKD STAGE 2 DUE TO TYPE 1 DIABETES MELLITUS (HCC): ICD-10-CM

## 2023-03-28 DIAGNOSIS — N18.2 CKD STAGE 2 DUE TO TYPE 1 DIABETES MELLITUS (HCC): ICD-10-CM

## 2023-03-28 DIAGNOSIS — E10.65 TYPE 1 DIABETES MELLITUS WITH HYPERGLYCEMIA (HCC): Primary | ICD-10-CM

## 2023-03-28 DIAGNOSIS — E10.29 MICROALBUMINURIA DUE TO TYPE 1 DIABETES MELLITUS (HCC): ICD-10-CM

## 2023-03-28 DIAGNOSIS — I10 ESSENTIAL HYPERTENSION: ICD-10-CM

## 2023-03-28 DIAGNOSIS — R80.9 MICROALBUMINURIA DUE TO TYPE 1 DIABETES MELLITUS (HCC): ICD-10-CM

## 2023-03-28 LAB — HBA1C MFR BLD: 12.7 %

## 2023-03-28 RX ORDER — INSULIN ASPART 100 [IU]/ML
INJECTION, SOLUTION INTRAVENOUS; SUBCUTANEOUS
Qty: 45 ML | Refills: 3
Start: 2023-03-28

## 2023-03-28 RX ORDER — INSULIN GLARGINE 100 [IU]/ML
INJECTION, SOLUTION SUBCUTANEOUS
Qty: 75 ML | Refills: 3
Start: 2023-03-28

## 2023-03-28 NOTE — PROGRESS NOTES
remains. I have asked her to focus on keeping her blood sugar between 202 50 at this time and we will continue to safely lower her average blood sugar with better insulin compliance and a more regimented approach to her diabetes      - AMB POC HEMOGLOBIN A1C  - LANTUS SOLOSTAR 100 UNIT/ML injection pen; 16 units BID, Titrate by 2 units every 3 days to fasting BG goal of  mg/dL. Max daily Dose: 80 units. Dispense: 75 mL; Refill: 3  - NOVOLOG FLEXPEN 100 UNIT/ML injection pen; 8 units AC breakfast/lunch, 6 units supper plus correction 1 for 100 above 200 mg/dL. Max Daily Dose: 50 units. E10.65. Dispense: 45 mL; Refill: 3  - Comprehensive Metabolic Panel; Future  - CBC with Auto Differential; Future  - Microalbumin / Creatinine Urine Ratio; Future  - Lipid Panel; Future  - Hemoglobin A1C; Future  - TSH with Reflex; Future  - MI CONTINUOUS GLUCOSE MONITORING ANALYSIS I&R    2. Microalbuminuria due to type 1 diabetes mellitus ([facility]  Patient would certainly benefit from improved glycemic control, reports good compliance to lisinopril    3. Essential hypertension  BP Readings from Last 3 Encounters:   03/28/23 130/70   12/20/22 (!) 150/90   09/20/22 (!) 151/80         4. Hypercholesterolemia  Nonstatin therapy    5. CKD stage 2 due to type 1 diabetes mellitus ([facility]  Pt would certainly benefit from improved glycemic control            Estiven Tubbs was seen today for follow-up and diabetes. Diagnoses and all orders for this visit:    Type 1 diabetes mellitus with hyperglycemia (HCC)  -     AMB POC HEMOGLOBIN A1C  -     LANTUS SOLOSTAR 100 UNIT/ML injection pen; 16 units BID, Titrate by 2 units every 3 days to fasting BG goal of  mg/dL. Max daily Dose: 80 units.  -     NOVOLOG FLEXPEN 100 UNIT/ML injection pen; 8 units AC breakfast/lunch, 6 units supper plus correction 1 for 100 above 200 mg/dL. Max Daily Dose: 50 units. E10.65.  -     Comprehensive Metabolic Panel;  Future  -     CBC with Auto Differential;

## 2023-03-31 ENCOUNTER — TELEPHONE (OUTPATIENT)
Dept: ENDOCRINOLOGY | Age: 63
End: 2023-03-31

## 2023-03-31 NOTE — TELEPHONE ENCOUNTER
This patient has a appointment with you 8/22/23. She called wanting to know if she could go on a omnipod .

## 2023-03-31 NOTE — TELEPHONE ENCOUNTER
The omnipod 5 that has the built-in low protection is not currently well covered by medicare and is best used with the dexcom G6    I recommend, if she is interested in a pump, she change from her current maggy to dexcom G6 and focus on taking insulin the way we discussed    We can then either put in for the tandem Tslim X2 pump covered by medicare or see, if at her next visit, the omnipod 5 is covered by medicare    All pumps require patient to learn to count carbohydrates with every meal and snack. It may be helpful for patient to practice carb counting so she is in a good position to start the pump.  I am happy to send to diabetes education for full education and extra help with carb counting if pt would like that

## 2023-04-04 NOTE — TELEPHONE ENCOUNTER
Spoke to the patient she said she will like to wait and just talk about maybe switching CGM's and going on a pump at her next visit.

## 2023-07-03 DIAGNOSIS — E10.65 HYPERGLYCEMIA DUE TO TYPE 1 DIABETES MELLITUS (HCC): ICD-10-CM

## 2023-07-03 RX ORDER — BLOOD-GLUCOSE METER
KIT MISCELLANEOUS
Qty: 300 EACH | Refills: 3 | Status: SHIPPED | OUTPATIENT
Start: 2023-07-03

## 2023-08-22 ENCOUNTER — TELEPHONE (OUTPATIENT)
Dept: DIABETES SERVICES | Age: 63
End: 2023-08-22

## 2023-08-22 ENCOUNTER — OFFICE VISIT (OUTPATIENT)
Dept: ENDOCRINOLOGY | Age: 63
End: 2023-08-22
Payer: MEDICARE

## 2023-08-22 VITALS
OXYGEN SATURATION: 97 % | DIASTOLIC BLOOD PRESSURE: 73 MMHG | SYSTOLIC BLOOD PRESSURE: 131 MMHG | HEART RATE: 99 BPM | WEIGHT: 158 LBS | BODY MASS INDEX: 23.33 KG/M2

## 2023-08-22 DIAGNOSIS — I10 ESSENTIAL HYPERTENSION: ICD-10-CM

## 2023-08-22 DIAGNOSIS — E10.29 MICROALBUMINURIA DUE TO TYPE 1 DIABETES MELLITUS (HCC): ICD-10-CM

## 2023-08-22 DIAGNOSIS — R80.9 MICROALBUMINURIA DUE TO TYPE 1 DIABETES MELLITUS (HCC): ICD-10-CM

## 2023-08-22 DIAGNOSIS — E78.00 HYPERCHOLESTEROLEMIA: ICD-10-CM

## 2023-08-22 DIAGNOSIS — R79.89 LOW TSH LEVEL: ICD-10-CM

## 2023-08-22 DIAGNOSIS — E10.22 CKD STAGE 2 DUE TO TYPE 1 DIABETES MELLITUS (HCC): ICD-10-CM

## 2023-08-22 DIAGNOSIS — E10.65 TYPE 1 DIABETES MELLITUS WITH HYPERGLYCEMIA (HCC): Primary | ICD-10-CM

## 2023-08-22 DIAGNOSIS — N18.2 CKD STAGE 2 DUE TO TYPE 1 DIABETES MELLITUS (HCC): ICD-10-CM

## 2023-08-22 LAB — HBA1C MFR BLD: 13 %

## 2023-08-22 PROCEDURE — 3046F HEMOGLOBIN A1C LEVEL >9.0%: CPT | Performed by: PHYSICIAN ASSISTANT

## 2023-08-22 PROCEDURE — G8427 DOCREV CUR MEDS BY ELIG CLIN: HCPCS | Performed by: PHYSICIAN ASSISTANT

## 2023-08-22 PROCEDURE — 99215 OFFICE O/P EST HI 40 MIN: CPT | Performed by: PHYSICIAN ASSISTANT

## 2023-08-22 PROCEDURE — 3017F COLORECTAL CA SCREEN DOC REV: CPT | Performed by: PHYSICIAN ASSISTANT

## 2023-08-22 PROCEDURE — 95251 CONT GLUC MNTR ANALYSIS I&R: CPT | Performed by: PHYSICIAN ASSISTANT

## 2023-08-22 PROCEDURE — G8420 CALC BMI NORM PARAMETERS: HCPCS | Performed by: PHYSICIAN ASSISTANT

## 2023-08-22 PROCEDURE — 1036F TOBACCO NON-USER: CPT | Performed by: PHYSICIAN ASSISTANT

## 2023-08-22 PROCEDURE — 2022F DILAT RTA XM EVC RTNOPTHY: CPT | Performed by: PHYSICIAN ASSISTANT

## 2023-08-22 PROCEDURE — 3075F SYST BP GE 130 - 139MM HG: CPT | Performed by: PHYSICIAN ASSISTANT

## 2023-08-22 PROCEDURE — 83036 HEMOGLOBIN GLYCOSYLATED A1C: CPT | Performed by: PHYSICIAN ASSISTANT

## 2023-08-22 PROCEDURE — 3078F DIAST BP <80 MM HG: CPT | Performed by: PHYSICIAN ASSISTANT

## 2023-08-22 NOTE — PROGRESS NOTES
[facility]   [facility]   [facility]RO[facility]ODULE CLINIC    Chloe Quintero PA-C  [address] [facility] Endocrinology and Thyroid Nodule Clinic  [address] [facility], [address], [address]  Phone [phone]  Facsimile [phone]          Isidro Douglas is a 60 y.o. female seen 8/22/2023 for follow up evaluation of type 1 diabetes on MDI        Assessment and Plan: Total time spent 43 min evaluating patient, reviewing documents, discussing risk of sequela and importance of management of disease, medication compliance, lifestyle improvement, and harnessing of available resources to support management and to evaluate possible undiagnosed condition    Interpretation of 72 hour glucose monitor: At least 72 hours of data were reviewed. The patient utilizes a maggy 2 continuous glucose monitoring system. The average glucose during the reviewed timeframe was 340. There is a pattern of constant hyperglycemia     1. Type 1 diabetes mellitus with hyperglycemia (HCC)  With historically poor but worsening glycemic control on MDI therapy. Patient is profoundly fearful of lows. She has a tendency to correct between meals and when she sees a down arrow on her CGM she eats to improve this for fear of hypoglycemia. Risks associated with her wildly uncontrolled diabetes reviewed at length, patient strongly encouraged to comply with basal bolus treatment regimen, injection technique is inadquate and was reviewed    She is interested in an insulin pump which will only be helpful if she is willing to allow the insulin to lower her glucose    It should be noted that she has symptoms of hypoglycemia less than 250 indicative of how high her blood sugar constantly remains.   I have asked her to focus on keeping her blood sugar between 200- 250 at this time and we will continue to safely lower her average blood sugar with better insulin compliance and a more regimented approach to her diabetes    Refer to diabetes education,

## 2023-08-22 NOTE — TELEPHONE ENCOUNTER
Voicemail box not set up. She called right back and scheduled a [facility] in [facility]. Type 2.

## 2023-08-23 ENCOUNTER — TELEPHONE (OUTPATIENT)
Dept: ENDOCRINOLOGY | Age: 63
End: 2023-08-23

## 2023-08-23 NOTE — TELEPHONE ENCOUNTER
Sent G6 order via [employer], called made pt aware. Advised pt to contact ins member services to determine coverage of Omnipod 5, and to call to inform the office of her findings.        Signature Pending  Requested from Dr. Galina Parra via Email on 8/23

## 2023-09-08 ENCOUNTER — FOLLOWUP TELEPHONE ENCOUNTER (OUTPATIENT)
Dept: DIABETES SERVICES | Age: 63
End: 2023-09-08

## 2023-10-17 ENCOUNTER — TELEPHONE (OUTPATIENT)
Dept: ENDOCRINOLOGY | Age: 63
End: 2023-10-17

## 2023-10-17 NOTE — TELEPHONE ENCOUNTER
Spoke to patient. She wanted to know why she was not getting omipods. As per Lacey's note, she needs to take education classes first before getting pump.  I explained this to the patient and she expressed understanding

## 2023-10-18 ENCOUNTER — FOLLOWUP TELEPHONE ENCOUNTER (OUTPATIENT)
Dept: DIABETES SERVICES | Age: 63
End: 2023-10-18

## 2023-10-25 NOTE — TELEPHONE ENCOUNTER
Per parachute: [facility] order :    [facility]  10/12 ?  7:18PM  Completed: Order for mxHero , as directed + 2 other items has been completed successfully

## 2023-10-26 RX ORDER — PROCHLORPERAZINE 25 MG/1
SUPPOSITORY RECTAL
Qty: 1 EACH | Refills: 0 | Status: SHIPPED | OUTPATIENT
Start: 2023-10-26

## 2023-10-26 NOTE — TELEPHONE ENCOUNTER
Spoke with patient, she was not given a  with her [facility] order, she is asking if we can send one over to a local pharmacy.     RX pending

## 2023-11-08 ENCOUNTER — TELEPHONE (OUTPATIENT)
Dept: ENDOCRINOLOGY | Age: 63
End: 2023-11-08

## 2023-11-08 NOTE — TELEPHONE ENCOUNTER
Patient left a message to get status on PA for her test strips. She states she never received a call. She would like someone to call her.

## 2023-11-09 ENCOUNTER — TELEPHONE (OUTPATIENT)
Dept: ENDOCRINOLOGY | Age: 63
End: 2023-11-09

## 2023-11-09 NOTE — TELEPHONE ENCOUNTER
Patient called stating she never got a g6 reader, but I looked in paracte and see she got one back in march of 2022. She swears up and down she never got a reader. Patient states she if she has to she would go back to [facility] instead of going though leong this trouble. We were trying to get on a pump which is why we switched to dexcom. I spoke to Gaudencio Hickman with [facility], he sees  the same thing I see on paracte. We did a PA and it got denied due to having one in the past 5 years.

## 2023-11-10 NOTE — TELEPHONE ENCOUNTER
Left message on voice mail to call back, not sure which test strips is she using or is she talking about the Dexcom G6.

## 2023-11-13 ENCOUNTER — FOLLOWUP TELEPHONE ENCOUNTER (OUTPATIENT)
Dept: DIABETES SERVICES | Age: 63
End: 2023-11-13

## 2023-11-13 NOTE — TELEPHONE ENCOUNTER
Second no show for type 1 diabetes nutrition 1 class. Will not call due to pt was called last week and confirmed her appointment. Cancelled nutrition 2 class due to needing first class before the second.

## 2023-11-14 NOTE — TELEPHONE ENCOUNTER
[facility] Saun[facility] to see if he has any receivers to spare.

## 2023-11-14 NOTE — TELEPHONE ENCOUNTER
Spoke to the Pt, she need a PA for the [facility] G6 reader and a refill on test strips. She is finger sticking until she can get a reader. Her phone is not compatible with the Dexcom apps to use her Dexcom.

## 2023-11-15 ENCOUNTER — TELEPHONE (OUTPATIENT)
Dept: ENDOCRINOLOGY | Age: 63
End: 2023-11-15

## 2023-11-15 NOTE — TELEPHONE ENCOUNTER
Patient left a message asking for any other news on her request. Patient would like a call back. For information on Fall & Injury Prevention, visit: [email or web address] OR  [email or web address] OR  https://www.cdc.gov/steadi/patient.html

## 2023-11-15 NOTE — TELEPHONE ENCOUNTER
Please make sure pt understands that the insulin pumps we discussed do not work with the [place] 3 at this time. We can either send a maggy 3 or she can contact tandem about the coverage for their insulin pump (call [phone]) as it will work with the dexcom G6 without the phone demetrius and without a .      Submit to DME and let me know what she decides

## 2023-11-15 NOTE — TELEPHONE ENCOUNTER
Spoke to Galdino osorio he don't have a Dexcom reader, Pt states her last reader was in 2018, but wants to know if she can just go to the [place] 3? She is tired of wanting a reader for her Dexcom.

## 2024-02-02 DIAGNOSIS — E10.65 HYPERGLYCEMIA DUE TO TYPE 1 DIABETES MELLITUS (HCC): ICD-10-CM

## 2024-02-02 RX ORDER — PEN NEEDLE, DIABETIC 32GX 5/32"
NEEDLE, DISPOSABLE MISCELLANEOUS
Qty: 400 EACH | Refills: 3 | OUTPATIENT
Start: 2024-02-02

## 2024-02-02 NOTE — TELEPHONE ENCOUNTER
The pharmacy sent a refill request on the patients BD pen needles. I called the patient She was not available. I could not leave a voicemail since it is not set up.

## 2024-03-07 ENCOUNTER — TELEPHONE (OUTPATIENT)
Dept: DIABETES SERVICES | Age: 64
End: 2024-03-07

## 2024-03-07 ENCOUNTER — OFFICE VISIT (OUTPATIENT)
Dept: ENDOCRINOLOGY | Age: 64
End: 2024-03-07
Payer: MEDICARE

## 2024-03-07 VITALS
OXYGEN SATURATION: 98 % | HEART RATE: 103 BPM | WEIGHT: 156.4 LBS | SYSTOLIC BLOOD PRESSURE: 138 MMHG | DIASTOLIC BLOOD PRESSURE: 70 MMHG | BODY MASS INDEX: 23.1 KG/M2

## 2024-03-07 DIAGNOSIS — E10.65 TYPE 1 DIABETES MELLITUS WITH HYPERGLYCEMIA (HCC): Primary | ICD-10-CM

## 2024-03-07 DIAGNOSIS — R80.9 MICROALBUMINURIA DUE TO TYPE 1 DIABETES MELLITUS (HCC): ICD-10-CM

## 2024-03-07 DIAGNOSIS — E10.22 CKD STAGE 2 DUE TO TYPE 1 DIABETES MELLITUS (HCC): ICD-10-CM

## 2024-03-07 DIAGNOSIS — E10.29 MICROALBUMINURIA DUE TO TYPE 1 DIABETES MELLITUS (HCC): ICD-10-CM

## 2024-03-07 DIAGNOSIS — E78.00 HYPERCHOLESTEROLEMIA: ICD-10-CM

## 2024-03-07 DIAGNOSIS — N18.2 CKD STAGE 2 DUE TO TYPE 1 DIABETES MELLITUS (HCC): ICD-10-CM

## 2024-03-07 DIAGNOSIS — I10 ESSENTIAL HYPERTENSION: ICD-10-CM

## 2024-03-07 LAB — HBA1C MFR BLD: 13.5 %

## 2024-03-07 PROCEDURE — G8427 DOCREV CUR MEDS BY ELIG CLIN: HCPCS | Performed by: PHYSICIAN ASSISTANT

## 2024-03-07 PROCEDURE — 3046F HEMOGLOBIN A1C LEVEL >9.0%: CPT | Performed by: PHYSICIAN ASSISTANT

## 2024-03-07 PROCEDURE — 2022F DILAT RTA XM EVC RTNOPTHY: CPT | Performed by: PHYSICIAN ASSISTANT

## 2024-03-07 PROCEDURE — 95251 CONT GLUC MNTR ANALYSIS I&R: CPT | Performed by: PHYSICIAN ASSISTANT

## 2024-03-07 PROCEDURE — G8420 CALC BMI NORM PARAMETERS: HCPCS | Performed by: PHYSICIAN ASSISTANT

## 2024-03-07 PROCEDURE — 83036 HEMOGLOBIN GLYCOSYLATED A1C: CPT | Performed by: PHYSICIAN ASSISTANT

## 2024-03-07 PROCEDURE — 3075F SYST BP GE 130 - 139MM HG: CPT | Performed by: PHYSICIAN ASSISTANT

## 2024-03-07 PROCEDURE — 3017F COLORECTAL CA SCREEN DOC REV: CPT | Performed by: PHYSICIAN ASSISTANT

## 2024-03-07 PROCEDURE — 99214 OFFICE O/P EST MOD 30 MIN: CPT | Performed by: PHYSICIAN ASSISTANT

## 2024-03-07 PROCEDURE — 3078F DIAST BP <80 MM HG: CPT | Performed by: PHYSICIAN ASSISTANT

## 2024-03-07 PROCEDURE — G8484 FLU IMMUNIZE NO ADMIN: HCPCS | Performed by: PHYSICIAN ASSISTANT

## 2024-03-07 PROCEDURE — 1036F TOBACCO NON-USER: CPT | Performed by: PHYSICIAN ASSISTANT

## 2024-03-07 RX ORDER — AMLODIPINE BESYLATE 10 MG/1
TABLET ORAL
COMMUNITY
Start: 2022-09-20

## 2024-03-07 RX ORDER — PEN NEEDLE, DIABETIC, SAFETY 29GX 5/16"
NEEDLE, DISPOSABLE MISCELLANEOUS
Qty: 500 EACH | Refills: 3 | Status: SHIPPED | OUTPATIENT
Start: 2024-03-07

## 2024-03-07 RX ORDER — BOLUS INSULIN PUMP, 200 UNIT 2 UNIT
EACH MISCELLANEOUS
Qty: 30 EACH | Refills: 3 | Status: SHIPPED | OUTPATIENT
Start: 2024-03-07

## 2024-03-07 NOTE — PROGRESS NOTES
[facility] ENDOCRINOLOGY   AND   THYROID NODULE CLINIC    Lacey Cruz PA-C  [facility] Endocrinology and Thyroid Nodule Clinic  [address], [address]  [place] [address]  Phone [phone]  Facsimile [phone]          Shelli Horn is a 63 y.o. female seen 3/7/2024 for follow up evaluation of type 1 diabetes on MDI        Assessment and Plan:      Interpretation of 72 hour glucose monitor:  At least 72 hours of data were reviewed.  The patient utilizes a maggy 2 continuous glucose monitoring system.  The average glucose during the reviewed timeframe was 374.  There is a pattern of constant hyperglycemia     1. Type 1 diabetes mellitus with hyperglycemia (HCC)  With historically poor but worsening glycemic control on MDI therapy.  Patient is profoundly fearful of lows.  She has a tendency to correct between meals and when she sees a down arrow on her CGM she eats to improve this for fear of hypoglycemia. She does not take prandial insulin if her CGM is showing a downward trend. Advised to eat protein, not grapes/juice/carbs when concerned about risk of \"lows\"    Moreover, her injection technique appears inadequate    Risks associated with her wildly uncontrolled diabetes reviewed at length, patient strongly encouraged to comply with basal bolus treatment regimen, injection technique is inadquate and was reviewed    She is interested in an insulin pump which will only be helpful if she is willing to allow the insulin to lower her glucose and if she can count carb    She has attended a diabetes class in [place] but I believe she would benefit from our FREE program    It should be noted that she has symptoms of hypoglycemia less than 250 indicative of how high her blood sugar constantly remains.  I have asked her to focus on keeping her blood sugar between 200-300 at this time and we will continue to safely lower her average blood sugar with better insulin compliance and a more regimented  none

## 2024-03-12 ENCOUNTER — FOLLOWUP TELEPHONE ENCOUNTER (OUTPATIENT)
Dept: DIABETES SERVICES | Age: 64
End: 2024-03-12

## 2024-03-12 NOTE — TELEPHONE ENCOUNTER
Second attempt to call pt regarding type 1 diabetes. Voicemail not set up. Will try calling one more time.

## 2024-03-13 ENCOUNTER — TELEPHONE (OUTPATIENT)
Dept: ENDOCRINOLOGY | Age: 64
End: 2024-03-13

## 2024-03-13 NOTE — TELEPHONE ENCOUNTER
Submitted PA via covermymeds for [employer], but came back stating it is not covered by plan. Will reach out to [employer].    CORTES:[id number]

## 2024-03-14 NOTE — TELEPHONE ENCOUNTER
This is not something I have been able to work around. I have one  patient who uses this at self pay price. Please encourage her to attend the diabetes education class here as we discussed and to focus on using the insulin pen correctly as we reviewed in our last appointment.     sergio Newell

## 2024-03-18 ENCOUNTER — FOLLOWUP TELEPHONE ENCOUNTER (OUTPATIENT)
Dept: DIABETES SERVICES | Age: 64
End: 2024-03-18

## 2024-03-22 RX ORDER — PEN NEEDLE, DIABETIC, SAFETY 29GX 5/16"
NEEDLE, DISPOSABLE MISCELLANEOUS
Qty: 500 EACH | Refills: 3 | Status: SHIPPED | OUTPATIENT
Start: 2024-03-22

## 2024-03-22 NOTE — TELEPHONE ENCOUNTER
I spoke to the pharmacy and they stated that the needles were delivered yesterday.  The patient states they did not deliver and she is [facility].  She went to the pharmacy and has looked everywhere and does not have them at all.  She will call me back.  We have samples if she can come get them

## 2024-03-22 NOTE — TELEPHONE ENCOUNTER
Pt called in states that [facility] said they did not receive prescription refill for pen needles from us.

## 2024-03-22 NOTE — TELEPHONE ENCOUNTER
The patient called [facility] states they delivered her needles to the house but she can't find them.  [facility] and she will go get them there even if insurance does not pay for them

## 2024-04-01 ENCOUNTER — TELEPHONE (OUTPATIENT)
Dept: ENDOCRINOLOGY | Age: 64
End: 2024-04-01

## 2024-04-01 NOTE — TELEPHONE ENCOUNTER
I have been attempting to have patient attend type 1 diabetes education with the intention of learning to count carbs so that she may be successful with use of a pump. She has not attended type 1 diabetes education    At her last visit we assessed that she not properly injecting her insulin and we did some in office training. We talked about getting a insulin patch to wear to ensure she gets good delivery of insulin which she was NOT getting when I watched her use the pen    Additionally, she is fearful of hypoglycemia and eats to correct any downtrending glucose even when they are above range. She also does not take her insulin for meals when glucose shows any down-trend    An insulin pump that works with the Dexcom will help to lower her blood sugars if she will allow it to    Most require carb counting. The Beta Bionic iLet is a new pump that does not require carb counting and it will work with her dexcom to lower her blood sugars to a target of 110. She will need to tell the pump she is eating at every meal and snack time. She can call insurance to check on coverage of this pump or I can refer her to another practice     In the meantime, she needs to be taking the injections correctly before every meal as discussed. She should still attend FREE DIABETES EDUCATION FOR TYPE 1 DIABETES regardless

## 2024-04-01 NOTE — TELEPHONE ENCOUNTER
Pt went to the hospital for DKA, couldn't give me which hospital nor which Dr she saw. She was told that she needed a pump. She wants to know if she can get a pump or she needs a referral to an endocrinologist that will give her one and hung up. Looked in Care Everywhere and don't see an ER encounter, hopefully you can see something.     Can Pt get a pump?

## 2024-04-02 NOTE — TELEPHONE ENCOUNTER
Called Pt, home phone mailbox is full and mobile phone \"mailbox has not been set up yet\" recording.

## 2024-04-02 NOTE — TELEPHONE ENCOUNTER
Called patient, she states she has been to diabetic education (its in care everywhere) on the following dates 2/13/24, 1/30/24. She has been taught how to carb count

## 2024-04-04 ENCOUNTER — TELEPHONE (OUTPATIENT)
Dept: ENDOCRINOLOGY | Age: 64
End: 2024-04-04

## 2024-04-04 NOTE — TELEPHONE ENCOUNTER
Patient called asking to be switched to Zoraida. She states she had the dexcom on last visit she was here.     In last phone encounter my notes states below;    \"she states she has been to diabetic education (its in care everywhere) on the following dates 24, 24.\"      She says her sugars have been better since she went to the hospital. She wants the zoraida because she was told the dexcom cannot be submerge under water for longer periods of time    I called patient back because is see she's currently using zoraida 2 and her sugars were high. She states she was using insulin that was  and that explains the high blood sugars. patient states she is currently taking Lantus 40 units in the morning and Novolog 6 units with meals per hospital orders.

## 2024-04-04 NOTE — TELEPHONE ENCOUNTER
Phone call from patient stating she does not want to use the pump after all. She would like a rx for Zoraida. She states she is watching what she eats and has started water aerobics.

## 2024-04-05 NOTE — TELEPHONE ENCOUNTER
Ok. Zoraida 2 does not work with any pumps, zoraida 2 plus works with only one pump. If patient wants zoraida, submit to DME for zoraida 2. I would be open to reviewing her download now that she is taking in date insulin

## 2024-04-09 DIAGNOSIS — E10.65 HYPERGLYCEMIA DUE TO TYPE 1 DIABETES MELLITUS (HCC): ICD-10-CM

## 2024-04-09 DIAGNOSIS — E10.65 TYPE 1 DIABETES MELLITUS WITH HYPERGLYCEMIA (HCC): ICD-10-CM

## 2024-04-09 RX ORDER — BLOOD-GLUCOSE METER
KIT MISCELLANEOUS
Qty: 300 EACH | Refills: 3 | Status: SHIPPED | OUTPATIENT
Start: 2024-04-09

## 2024-04-09 RX ORDER — INSULIN ASPART 100 [IU]/ML
INJECTION, SOLUTION INTRAVENOUS; SUBCUTANEOUS
Qty: 45 ML | Refills: 1 | Status: SHIPPED | OUTPATIENT
Start: 2024-04-09

## 2024-04-09 NOTE — TELEPHONE ENCOUNTER
Diamond Vanegas   [facility]  40m ago  [facility] is unable to fulfill the order for the following reason: Patient has received same and similar. Mandeep Sheffield  @[employer]  Thank you for submitting and confirming the Zoraida 2 order.   The patient recently received a 3 month supply order on 3/11/2024 for the Dexcom G6 products.  The tracking number is [id number] and can be tracked via the [facility] website. The patient can change supplies on the next order fill in 6/24 to the Zoraida 2.

## 2024-04-09 NOTE — TELEPHONE ENCOUNTER
Patient is going to come by and get a dexcom g6 transmitter so that she can wear her dexcoms until she gets the maggy

## 2024-05-07 ENCOUNTER — TELEPHONE (OUTPATIENT)
Dept: ENDOCRINOLOGY | Age: 64
End: 2024-05-07

## 2024-06-03 ENCOUNTER — TELEPHONE (OUTPATIENT)
Dept: ENDOCRINOLOGY | Age: 64
End: 2024-06-03

## 2024-06-03 NOTE — TELEPHONE ENCOUNTER
Pt called, she was in the hospital ([facility]) last week due to her BG and wants a sooner appt.     -She is taking 36 units Lantus once in the morning  -She is doing only 8 units 3 times a day.   -She states her insulin is not expires by dates or heat.   - She changes her injection sit every time, two on stomach, arm and thigh.    - Her dexcom is not showing her BG stating to high    She can't get her BG down below 350. She wants a sooner appt.    Lacey has an opening 6/4/23 @ 11:00 she took it.

## 2024-06-03 NOTE — PROGRESS NOTES
74   02/16/2022      Cr 1.13, GFR 60, microalbumin/Cr ratio 217  08/03/2022  Cr 0.98, GFR 65, microalbumin/Cr ratio 192.5     10/12/2022      Cr 1.02, GFR 62    05/30/2024 Cr 0.90, GFR 71       Lipids:     Current therapy This patient does not have an active medication from one of the medication groupers.          No results found for: \"CHOL\"  No components found for: \"LDLCHOLESTEROL\", \"LDLCALC\" No results found for: \"VLDL\" No results found for: \"HDL\" No results found for: \"HDL\" No results found for: \"TRIG\"  No results found for: \"CHOLHDLRATIO\"      Hemoglobin A1c:  02/10/2021      HgbA1C:         8.8%   05/10/2021      HgbA1C:         9.3%   08/02/2021      HgbA1C:         9.9%   12/28/2021      HgbA1C:         9.6%  06/01/2022      12.3%    10/12/2022       12.3%   03/28/2023 12.7%   07/20/2023 12.5%   08/22/2023 13.0%   03/07/2024 13.5%   05/01/2024 12.7%    Hemoglobin A1C, POC   Date Value Ref Range Status   03/07/2024 13.5 % Final   08/22/2023 13.0 % Final   03/28/2023 12.7 % Final        Thyroid:      11/18/2019      TSH     1.090   12/10/2020      TSH     0.769   02/16/2022      TSH     0.807  08/03/2022              0.351  09/13/2022              0.290  09/16/2022              0.480 (0.35-4.94), free T4 1.14  10/12/2022      TSH     0.385, Free T4  1.13,  07/20/2023  TSH 0.329, Free T4 1.23    05/30/2024 TSH 0.414    No results found for: \"TSH\", \"TSH2\", \"TSH3\"       Reviewed and updated this visit by provider:  Tobacco  Allergies  Meds  Problems  Med Hx  Surg Hx  Fam Hx                    Allergies & Medications:  Reviewed in chart.        Review of Systems    Vital Signs:  /64 (Site: Right Upper Arm, Position: Sitting, Cuff Size: Medium Adult)   Pulse 84   Wt 67.6 kg (149 lb)   SpO2 96%   BMI 22.00 kg/m²     Physical Exam  Constitutional:       Appearance: Normal appearance.   HENT:      Head: Normocephalic.   Neck:      Thyroid: No thyroid mass or thyromegaly.      Vascular: No carotid

## 2024-06-04 ENCOUNTER — TELEPHONE (OUTPATIENT)
Dept: DIABETES SERVICES | Age: 64
End: 2024-06-04

## 2024-06-04 ENCOUNTER — OFFICE VISIT (OUTPATIENT)
Dept: ENDOCRINOLOGY | Age: 64
End: 2024-06-04
Payer: MEDICARE

## 2024-06-04 VITALS
HEART RATE: 84 BPM | OXYGEN SATURATION: 96 % | SYSTOLIC BLOOD PRESSURE: 120 MMHG | DIASTOLIC BLOOD PRESSURE: 64 MMHG | WEIGHT: 149 LBS | BODY MASS INDEX: 22 KG/M2

## 2024-06-04 DIAGNOSIS — E78.00 HYPERCHOLESTEROLEMIA: ICD-10-CM

## 2024-06-04 DIAGNOSIS — N18.2 CKD STAGE 2 DUE TO TYPE 1 DIABETES MELLITUS (HCC): ICD-10-CM

## 2024-06-04 DIAGNOSIS — I10 ESSENTIAL HYPERTENSION: ICD-10-CM

## 2024-06-04 DIAGNOSIS — E10.22 CKD STAGE 2 DUE TO TYPE 1 DIABETES MELLITUS (HCC): ICD-10-CM

## 2024-06-04 DIAGNOSIS — E10.29 MICROALBUMINURIA DUE TO TYPE 1 DIABETES MELLITUS (HCC): ICD-10-CM

## 2024-06-04 DIAGNOSIS — E10.65 TYPE 1 DIABETES MELLITUS WITH HYPERGLYCEMIA (HCC): Primary | ICD-10-CM

## 2024-06-04 DIAGNOSIS — R80.9 MICROALBUMINURIA DUE TO TYPE 1 DIABETES MELLITUS (HCC): ICD-10-CM

## 2024-06-04 PROCEDURE — 95251 CONT GLUC MNTR ANALYSIS I&R: CPT | Performed by: PHYSICIAN ASSISTANT

## 2024-06-04 PROCEDURE — 3017F COLORECTAL CA SCREEN DOC REV: CPT | Performed by: PHYSICIAN ASSISTANT

## 2024-06-04 PROCEDURE — G8427 DOCREV CUR MEDS BY ELIG CLIN: HCPCS | Performed by: PHYSICIAN ASSISTANT

## 2024-06-04 PROCEDURE — 3074F SYST BP LT 130 MM HG: CPT | Performed by: PHYSICIAN ASSISTANT

## 2024-06-04 PROCEDURE — G8420 CALC BMI NORM PARAMETERS: HCPCS | Performed by: PHYSICIAN ASSISTANT

## 2024-06-04 PROCEDURE — 2022F DILAT RTA XM EVC RTNOPTHY: CPT | Performed by: PHYSICIAN ASSISTANT

## 2024-06-04 PROCEDURE — 1036F TOBACCO NON-USER: CPT | Performed by: PHYSICIAN ASSISTANT

## 2024-06-04 PROCEDURE — 3078F DIAST BP <80 MM HG: CPT | Performed by: PHYSICIAN ASSISTANT

## 2024-06-04 PROCEDURE — 3046F HEMOGLOBIN A1C LEVEL >9.0%: CPT | Performed by: PHYSICIAN ASSISTANT

## 2024-06-04 PROCEDURE — 99215 OFFICE O/P EST HI 40 MIN: CPT | Performed by: PHYSICIAN ASSISTANT

## 2024-06-04 RX ORDER — GLUCAGON INJECTION, SOLUTION 1 MG/.2ML
1 INJECTION, SOLUTION SUBCUTANEOUS PRN
Qty: 2 EACH | Refills: 1 | Status: SHIPPED | OUTPATIENT
Start: 2024-06-04

## 2024-06-04 NOTE — TELEPHONE ENCOUNTER
Type One.  Called cell and no mailbox set up. Called her main home number and that mailbox is full.

## 2024-06-11 ENCOUNTER — FOLLOWUP TELEPHONE ENCOUNTER (OUTPATIENT)
Dept: DIABETES SERVICES | Age: 64
End: 2024-06-11

## 2024-06-11 NOTE — TELEPHONE ENCOUNTER
Second attempt to call pt. Home number unable to leave a message and tried mobile number and voicemail not set up.

## 2024-06-25 ENCOUNTER — TELEPHONE (OUTPATIENT)
Dept: ENDOCRINOLOGY | Age: 64
End: 2024-06-25

## 2024-06-25 DIAGNOSIS — E10.65 HYPERGLYCEMIA DUE TO TYPE 1 DIABETES MELLITUS (HCC): ICD-10-CM

## 2024-06-25 RX ORDER — BLOOD-GLUCOSE METER
KIT MISCELLANEOUS
Qty: 300 EACH | Refills: 3 | Status: SHIPPED | OUTPATIENT
Start: 2024-06-25

## 2024-07-08 DIAGNOSIS — E10.65 TYPE 1 DIABETES MELLITUS WITH HYPERGLYCEMIA (HCC): ICD-10-CM

## 2024-07-08 RX ORDER — INSULIN GLARGINE 100 [IU]/ML
INJECTION, SOLUTION SUBCUTANEOUS
Refills: 0 | OUTPATIENT
Start: 2024-07-08

## 2024-07-09 ENCOUNTER — TELEPHONE (OUTPATIENT)
Dept: DIABETES SERVICES | Age: 64
End: 2024-07-09

## 2024-07-09 NOTE — TELEPHONE ENCOUNTER
Called and left message to call her about education. Called and mailbox is not set up yet.  Will see if can be reached again by My Chart. Patient called right back and left message someone trying to call her.  Called her back.  Type 1. She reports cannot come at class times for Type 1 as coming from [place].Confirmed trying to get a pump. She wants Nutrition virtual on same day, she asks if she has to take Living and Coping.  Instructed recommended but will be between her and her physician.  She said she does not want to take those classes. Scheduled Nutrition for August 14, 2024 1-3 PM

## 2024-07-16 DIAGNOSIS — E10.65 TYPE 1 DIABETES MELLITUS WITH HYPERGLYCEMIA (HCC): ICD-10-CM

## 2024-07-16 RX ORDER — INSULIN ASPART 100 [IU]/ML
INJECTION, SOLUTION INTRAVENOUS; SUBCUTANEOUS
Qty: 45 ML | Refills: 1 | Status: SHIPPED | OUTPATIENT
Start: 2024-07-16

## 2024-07-16 RX ORDER — INSULIN GLARGINE 100 [IU]/ML
INJECTION, SOLUTION SUBCUTANEOUS
Refills: 0 | OUTPATIENT
Start: 2024-07-16

## 2024-07-16 RX ORDER — INSULIN GLARGINE 100 [IU]/ML
INJECTION, SOLUTION SUBCUTANEOUS
Qty: 75 ML | Refills: 1 | Status: SHIPPED | OUTPATIENT
Start: 2024-07-16

## 2024-07-17 ENCOUNTER — FOLLOWUP TELEPHONE ENCOUNTER (OUTPATIENT)
Dept: DIABETES SERVICES | Age: 64
End: 2024-07-17

## 2024-07-17 NOTE — TELEPHONE ENCOUNTER
Pt called stating she has type 1 diabetes and wanted to sign up for the nutrition classes. Scheduled for 7/17/24.

## 2024-07-19 ENCOUNTER — TELEPHONE (OUTPATIENT)
Dept: ENDOCRINOLOGY | Age: 64
End: 2024-07-19

## 2024-07-19 NOTE — TELEPHONE ENCOUNTER
Pt called and she went to the nutrition class and they went over pumps briefly, she is interested in the Omnipod Dash. What is the next step to getting a pump. She is on the Dexcom CGM system.  Please advise.

## 2024-07-22 NOTE — TELEPHONE ENCOUNTER
Spoke to the Pt, she don't want anything with a tubing, she states she does know how to count carbs and needs a pump to keep her BG out of the 500's and then \"crash\" when her insulin decides to work. She is on a  for her Dexcom, told her we need to download her , she states the next time she is up this way. What information do we have about the Beta Bionic iLet pump that does not require carb counting or one of the AID pumps?

## 2024-07-22 NOTE — TELEPHONE ENCOUNTER
The educator stated patient could accurately count carbs only half the time. If she would like an insulin pump I recommend either the Beta Bionic iLet pump that does not require carb counting or one of the AID pumps. The Omnipod 5 that works with the Dexcom G6 and now G7 or the tandem tslim X2 because both of these have low blood sugar protection that is not available on the omnipod DASH. I do not recommend omnipod DASH    Please remind patient that insulin pumps are designed to help patient keep her blood sugar in range. Please ask if she is willing to start a pump that will constantly try to lower her blood sugar into the 100s. Please document her response    Please print me the last 30 days of her CGM- I think she is on dexcom

## 2024-07-22 NOTE — TELEPHONE ENCOUNTER
Pt called back for status or next step for pump? Please advise.    [address]  [address], [address]  Phone: [phone]  Fax: [phone]    March 13, 2020    [address] [place] [address]  [address] [place] [address] [address] [address]      Dear Ashkan Mckeon,    Thank you for choosing [address] [address] Emergency Department on 3/5/20. Dr. Brittney Vargas wanted to make sure that you understand your discharge instructions and that you were able to fill any prescriptions that may have been ordered for you. Please contact the office at the above phone number if the ED advised to you follow up with Dr. Brittney Vargas, or if you have any further questions or needs. Also did you know -   *Visiting the ED for a non-emergency could result in higher co-pays than you would normally be subject to paying? *You can call your doctor even after hours so they can direct you to the most appropriate care. [facility]) practices can often offer you an appointment on the same day that you call. *We have some [facility] offices that offer Walk-in appointments; check our website for availability in your community, [email or web address].      *Evisits are now available for patients for $36 through [employer] for certain conditions:  * Sinus, cold and or cough       * Diarrhea            * Headache       *vaginal discharge     *flu symptoms  * Heartburn                                * Poison Meena          * Back pain     * Urinary problems                               If you do not have [employer] and are interested, please contact the office and a staff member may assist you or go to [email or web address].     Sincerely,     Brittney Vargas MD and your [facility]

## 2024-07-23 NOTE — TELEPHONE ENCOUNTER
Please ask these questions from before and document a response:    1. Please find out if pt is now getting insulin she is taking with her injections and insulin no longer dripping on the floor    2. Please remind patient that insulin pumps are designed to help patient keep her blood sugar in range. Please ask if she is willing to start a pump that will constantly try to lower her blood sugar into the 100s. Please document her response    3. The tandem Tslim X2 is a tubed pump that works with the dexcom. This goes through DME and there is an upfront cost and in person training. This pump requires adequate carb counting skills. Is this what patient wants to try?

## 2024-07-23 NOTE — TELEPHONE ENCOUNTER
Patient called back, she researched pump and now what's a T-slim. Does she just need to come in to get the process started?

## 2024-07-24 NOTE — TELEPHONE ENCOUNTER
Spoke to the Pt, she replied to all the questions that was asked. Her response was as follows:     1) 1. Please find out if pt is now getting insulin she is taking with her injections and insulin no longer dripping on the floor?    She is giving herself insulin correctly with using the Novofine needles because she can't use the needle you suggested because she's bleeds every time.       2. Please remind patient that insulin pumps are designed to help patient keep her blood sugar in range. Please ask if she is willing to start a pump that will constantly try to lower her blood sugar into the 100s. Please document her response     She states she will try  - thinks there is delay in her body metabolizing her insulin. I told her to get BG down between 120-80 and she how she feels and she needs to get used to the feeling for about month and half. She was concerned about the BG level but will try.       3. The tandem Tslim X2 is a tubed pump that works with the dexcom. This goes through [employer] and there is an upfront cost and in person training. This pump requires adequate carb counting skills. Is this what patient wants to try?     She said she will try and find a carb counting class closer to her and I told her that patients take to free class couple of times to get the understand and feeling comfortable with carb counting. I told her that the pump and supplies goes through a [employer] company and she needs to have a compatible phone that will allow all the programs to run on it. She is just concern with her BG getting high and them drops quickly. I explained how and when to take her insulin with going over the correction scale. She states again that she will find another nutrition class to take and discuss with you at her Sept visit.

## 2024-08-19 NOTE — TELEPHONE ENCOUNTER
The Pt called and I relayed your message, she states she wants the Omnipod 5 and will look for carb counting class and will discuss more on her follow up visit.

## 2024-09-24 ENCOUNTER — OFFICE VISIT (OUTPATIENT)
Dept: ENDOCRINOLOGY | Age: 64
End: 2024-09-24
Payer: MEDICARE

## 2024-09-24 VITALS
HEART RATE: 106 BPM | OXYGEN SATURATION: 98 % | SYSTOLIC BLOOD PRESSURE: 132 MMHG | BODY MASS INDEX: 21.74 KG/M2 | DIASTOLIC BLOOD PRESSURE: 82 MMHG | WEIGHT: 147.2 LBS

## 2024-09-24 DIAGNOSIS — N18.2 CKD STAGE 2 DUE TO TYPE 1 DIABETES MELLITUS (HCC): ICD-10-CM

## 2024-09-24 DIAGNOSIS — E78.00 HYPERCHOLESTEROLEMIA: ICD-10-CM

## 2024-09-24 DIAGNOSIS — R80.9 MICROALBUMINURIA DUE TO TYPE 1 DIABETES MELLITUS (HCC): ICD-10-CM

## 2024-09-24 DIAGNOSIS — E10.22 CKD STAGE 2 DUE TO TYPE 1 DIABETES MELLITUS (HCC): ICD-10-CM

## 2024-09-24 DIAGNOSIS — E10.29 MICROALBUMINURIA DUE TO TYPE 1 DIABETES MELLITUS (HCC): ICD-10-CM

## 2024-09-24 DIAGNOSIS — E10.65 TYPE 1 DIABETES MELLITUS WITH HYPERGLYCEMIA (HCC): Primary | ICD-10-CM

## 2024-09-24 DIAGNOSIS — I10 ESSENTIAL HYPERTENSION: ICD-10-CM

## 2024-09-24 PROCEDURE — G2211 COMPLEX E/M VISIT ADD ON: HCPCS | Performed by: PHYSICIAN ASSISTANT

## 2024-09-24 PROCEDURE — G8427 DOCREV CUR MEDS BY ELIG CLIN: HCPCS | Performed by: PHYSICIAN ASSISTANT

## 2024-09-24 PROCEDURE — 2022F DILAT RTA XM EVC RTNOPTHY: CPT | Performed by: PHYSICIAN ASSISTANT

## 2024-09-24 PROCEDURE — 3017F COLORECTAL CA SCREEN DOC REV: CPT | Performed by: PHYSICIAN ASSISTANT

## 2024-09-24 PROCEDURE — 99214 OFFICE O/P EST MOD 30 MIN: CPT | Performed by: PHYSICIAN ASSISTANT

## 2024-09-24 PROCEDURE — G8420 CALC BMI NORM PARAMETERS: HCPCS | Performed by: PHYSICIAN ASSISTANT

## 2024-09-24 PROCEDURE — 3075F SYST BP GE 130 - 139MM HG: CPT | Performed by: PHYSICIAN ASSISTANT

## 2024-09-24 PROCEDURE — 3079F DIAST BP 80-89 MM HG: CPT | Performed by: PHYSICIAN ASSISTANT

## 2024-09-24 PROCEDURE — 3046F HEMOGLOBIN A1C LEVEL >9.0%: CPT | Performed by: PHYSICIAN ASSISTANT

## 2024-09-24 PROCEDURE — 1036F TOBACCO NON-USER: CPT | Performed by: PHYSICIAN ASSISTANT

## 2024-09-24 RX ORDER — PRAVASTATIN SODIUM 10 MG
10 TABLET ORAL DAILY
COMMUNITY

## 2024-09-24 RX ORDER — INSULIN PMP CART,AUT,G6/7,CNTR
EACH SUBCUTANEOUS
Qty: 1 KIT | Refills: 0 | Status: SHIPPED | OUTPATIENT
Start: 2024-09-24

## 2024-09-24 RX ORDER — INSULIN PMP CART,AUT,G6/7,CNTR
EACH SUBCUTANEOUS
Qty: 30 EACH | Refills: 1 | Status: SHIPPED | OUTPATIENT
Start: 2024-09-24

## 2024-09-24 RX ORDER — ACYCLOVIR 400 MG/1
TABLET ORAL
Qty: 9 EACH | Refills: 3 | Status: SHIPPED | OUTPATIENT
Start: 2024-09-24

## 2024-09-24 RX ORDER — GLUCAGON INJECTION, SOLUTION 1 MG/.2ML
1 INJECTION, SOLUTION SUBCUTANEOUS PRN
Qty: 2 EACH | Refills: 1 | Status: SHIPPED | OUTPATIENT
Start: 2024-09-24

## 2024-09-25 ENCOUNTER — TELEPHONE (OUTPATIENT)
Dept: ENDOCRINOLOGY | Age: 64
End: 2024-09-25

## 2024-10-15 DIAGNOSIS — E10.65 TYPE 1 DIABETES MELLITUS WITH HYPERGLYCEMIA (HCC): ICD-10-CM

## 2024-10-15 RX ORDER — INSULIN GLARGINE 100 [IU]/ML
INJECTION, SOLUTION SUBCUTANEOUS
Qty: 30 ML | Refills: 1 | Status: SHIPPED | OUTPATIENT
Start: 2024-10-15 | End: 2024-10-18 | Stop reason: SDUPTHER

## 2024-10-15 RX ORDER — INSULIN ASPART 100 [IU]/ML
INJECTION, SOLUTION INTRAVENOUS; SUBCUTANEOUS
Qty: 45 ML | Refills: 1 | Status: SHIPPED | OUTPATIENT
Start: 2024-10-15

## 2024-10-18 ENCOUNTER — TELEPHONE (OUTPATIENT)
Dept: ENDOCRINOLOGY | Age: 64
End: 2024-10-18

## 2024-10-18 DIAGNOSIS — E10.65 TYPE 1 DIABETES MELLITUS WITH HYPERGLYCEMIA (HCC): ICD-10-CM

## 2024-10-18 RX ORDER — INSULIN GLARGINE 100 [IU]/ML
INJECTION, SOLUTION SUBCUTANEOUS
Qty: 30 ML | Refills: 1 | Status: SHIPPED | OUTPATIENT
Start: 2024-10-18
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
## 2024-10-18 NOTE — TELEPHONE ENCOUNTER
Phone call from patient stating her sugars are staying high. This morning at 6am it was saying over 600 on her meter. She took 6 units of Novolog.    10:14 am still saying over 600, so she took another 5 units of Novolog.    Had a sausage mcmuffin just now from [employer]      No steroids, no infection.

## 2024-10-24 ENCOUNTER — TELEPHONE (OUTPATIENT)
Dept: ENDOCRINOLOGY | Age: 64
End: 2024-10-24

## 2024-10-24 NOTE — TELEPHONE ENCOUNTER
Called [facility] to verify PA status on Omnipod. Outcome: has been approved and sent to [facility] pharmacy [facility] to fill and send to patient.

## 2024-10-24 NOTE — TELEPHONE ENCOUNTER
Patient called in asking for update on sensors and Omnipod as she got the email from [employer] on Monday but nothing. I told patient she needs to contact [employer] to check the shipping status.  Patient was upset that it took 3 months to get approved. I explained to her that Prior Authorizations can take time. Patient has 8 days left on sensor and asked what she is to do in the meantime. I told patient to call [employer] to get the status. If the sensor runs out in the meantime, to call our office to see if we can provide a sample.

## 2024-11-07 ENCOUNTER — TELEPHONE (OUTPATIENT)
Dept: ENDOCRINOLOGY | Age: 64
End: 2024-11-07

## 2024-11-07 DIAGNOSIS — E10.65 TYPE 1 DIABETES MELLITUS WITH HYPERGLYCEMIA (HCC): Primary | ICD-10-CM

## 2024-11-07 DIAGNOSIS — Z96.41 INSULIN PUMP STATUS: ICD-10-CM

## 2024-11-07 RX ORDER — INSULIN ASPART 100 [IU]/ML
INJECTION, SOLUTION INTRAVENOUS; SUBCUTANEOUS
Qty: 50 ML | Refills: 3 | Status: SHIPPED | OUTPATIENT
Start: 2024-11-07

## 2024-11-08 DIAGNOSIS — E10.65 TYPE 1 DIABETES MELLITUS WITH HYPERGLYCEMIA (HCC): ICD-10-CM

## 2024-11-08 RX ORDER — PROCHLORPERAZINE 25 MG/1
SUPPOSITORY RECTAL
Qty: 3 EACH | Refills: 0 | Status: SHIPPED | OUTPATIENT
Start: 2024-11-08

## 2024-11-08 RX ORDER — PROCHLORPERAZINE 25 MG/1
SUPPOSITORY RECTAL
COMMUNITY
End: 2024-11-08 | Stop reason: SDUPTHER

## 2024-12-12 DIAGNOSIS — E10.65 TYPE 1 DIABETES MELLITUS WITH HYPERGLYCEMIA (HCC): ICD-10-CM

## 2024-12-12 RX ORDER — INSULIN GLARGINE 100 [IU]/ML
INJECTION, SOLUTION SUBCUTANEOUS
Qty: 30 ML | Refills: 1 | Status: SHIPPED | OUTPATIENT
Start: 2024-12-12

## 2024-12-12 NOTE — TELEPHONE ENCOUNTER
Patient called requesting refill for Lantus to be sent to [facility]. Patient stated [facility] told her she needed a new order. Patient request call back once refill sent.

## 2025-01-31 ENCOUNTER — OFFICE VISIT (OUTPATIENT)
Dept: ENDOCRINOLOGY | Age: 65
End: 2025-01-31
Payer: MEDICARE

## 2025-01-31 VITALS
BODY MASS INDEX: 22.7 KG/M2 | OXYGEN SATURATION: 98 % | SYSTOLIC BLOOD PRESSURE: 130 MMHG | DIASTOLIC BLOOD PRESSURE: 64 MMHG | HEART RATE: 109 BPM | HEIGHT: 68 IN | WEIGHT: 149.8 LBS

## 2025-01-31 DIAGNOSIS — R80.9 MICROALBUMINURIA DUE TO TYPE 1 DIABETES MELLITUS (HCC): ICD-10-CM

## 2025-01-31 DIAGNOSIS — S91.112A LACERATION OF LEFT GREAT TOE WITHOUT DAMAGE TO NAIL, FOREIGN BODY PRESENCE UNSPECIFIED, INITIAL ENCOUNTER: ICD-10-CM

## 2025-01-31 DIAGNOSIS — Z96.41 INSULIN PUMP STATUS: ICD-10-CM

## 2025-01-31 DIAGNOSIS — E10.65 TYPE 1 DIABETES MELLITUS WITH HYPERGLYCEMIA (HCC): Primary | ICD-10-CM

## 2025-01-31 DIAGNOSIS — E10.22 CKD STAGE 2 DUE TO TYPE 1 DIABETES MELLITUS (HCC): ICD-10-CM

## 2025-01-31 DIAGNOSIS — E78.00 HYPERCHOLESTEROLEMIA: ICD-10-CM

## 2025-01-31 DIAGNOSIS — N18.2 CKD STAGE 2 DUE TO TYPE 1 DIABETES MELLITUS (HCC): ICD-10-CM

## 2025-01-31 DIAGNOSIS — E10.29 MICROALBUMINURIA DUE TO TYPE 1 DIABETES MELLITUS (HCC): ICD-10-CM

## 2025-01-31 DIAGNOSIS — I10 ESSENTIAL HYPERTENSION: ICD-10-CM

## 2025-01-31 LAB — HBA1C MFR BLD: 14 %

## 2025-01-31 PROCEDURE — 99215 OFFICE O/P EST HI 40 MIN: CPT | Performed by: PHYSICIAN ASSISTANT

## 2025-01-31 PROCEDURE — 3075F SYST BP GE 130 - 139MM HG: CPT | Performed by: PHYSICIAN ASSISTANT

## 2025-01-31 PROCEDURE — 3078F DIAST BP <80 MM HG: CPT | Performed by: PHYSICIAN ASSISTANT

## 2025-01-31 PROCEDURE — G8420 CALC BMI NORM PARAMETERS: HCPCS | Performed by: PHYSICIAN ASSISTANT

## 2025-01-31 PROCEDURE — G8400 PT W/DXA NO RESULTS DOC: HCPCS | Performed by: PHYSICIAN ASSISTANT

## 2025-01-31 PROCEDURE — 1123F ACP DISCUSS/DSCN MKR DOCD: CPT | Performed by: PHYSICIAN ASSISTANT

## 2025-01-31 PROCEDURE — G8427 DOCREV CUR MEDS BY ELIG CLIN: HCPCS | Performed by: PHYSICIAN ASSISTANT

## 2025-01-31 PROCEDURE — 3017F COLORECTAL CA SCREEN DOC REV: CPT | Performed by: PHYSICIAN ASSISTANT

## 2025-01-31 PROCEDURE — G2211 COMPLEX E/M VISIT ADD ON: HCPCS | Performed by: PHYSICIAN ASSISTANT

## 2025-01-31 PROCEDURE — 1036F TOBACCO NON-USER: CPT | Performed by: PHYSICIAN ASSISTANT

## 2025-01-31 PROCEDURE — 1090F PRES/ABSN URINE INCON ASSESS: CPT | Performed by: PHYSICIAN ASSISTANT

## 2025-01-31 PROCEDURE — 3046F HEMOGLOBIN A1C LEVEL >9.0%: CPT | Performed by: PHYSICIAN ASSISTANT

## 2025-01-31 PROCEDURE — 83036 HEMOGLOBIN GLYCOSYLATED A1C: CPT | Performed by: PHYSICIAN ASSISTANT

## 2025-01-31 PROCEDURE — 2022F DILAT RTA XM EVC RTNOPTHY: CPT | Performed by: PHYSICIAN ASSISTANT

## 2025-01-31 RX ORDER — MUPIROCIN 20 MG/G
OINTMENT TOPICAL
Qty: 30 G | Refills: 1 | Status: SHIPPED | OUTPATIENT
Start: 2025-01-31

## 2025-01-31 RX ORDER — ACYCLOVIR 400 MG/1
TABLET ORAL
Qty: 9 EACH | Refills: 0 | Status: SHIPPED | OUTPATIENT
Start: 2025-01-31

## 2025-01-31 RX ORDER — INSULIN PMP CART,AUT,G6/7,CNTR
EACH SUBCUTANEOUS
Qty: 30 EACH | Refills: 0 | Status: SHIPPED | OUTPATIENT
Start: 2025-01-31

## 2025-01-31 NOTE — PROGRESS NOTES
basal 1.5u/hr  Time  00:00  0.8  Carb Ratio 00:00  10  Insulin Sensitivity 45  Target low  150  Target high 180  Active Insulin time 3:00  Max Bolus 9 units    Microalbuminuria due to type 1 diabetes mellitus (HCC)    Essential hypertension    Hypercholesterolemia    CKD stage 2 due to type 1 diabetes mellitus (HCC)    Laceration of left great toe without damage to nail, foreign body presence unspecified, initial encounter  -     mupirocin (BACTROBAN) 2 % ointment; Apply topically 3 times daily.              History of Present Illness:      Portions of this note were generated with the assistance of voice recogniton software.  As such, some errors in transcription may be present.  1/31/2025   Interim diabetes HPI:    History of Present Illness  The patient is a 64-year-old female presenting for type 1 diabetes     During exam we uncovered a left toe injury evaluation.    She has difficulties with her new omnipod insulin pump setup and operation, often losing sensor connection. She has used the device twice, finding that it administers 2-3 units of insulin each time, and is awaiting a new supply of pods. She uses the Dexcom G7 sensor and PDM. Her blood glucose levels have been 319-350 lately on MDI, an improvement from previous levels. She plans to increase her NovoLog dosage from 6 to 7 units.     She accidentally administered 26 units of insulin once, resulting in severe hypoglycemia.     Her pump occasionally makes a clicking sound and has twice indicated continued insulin delivery after pod removal. No hypoglycemic episodes on MDI lately. She has issues with her Dexcom G7 sensor, often failing to connect and indicating sensor failure, and has been managing her diabetes with regular insulin injections. Her diet includes grits, eggs, cheese, coffee with Sweet'N Low for breakfast; Chick-carie-A #1 meal with pickles, macaroni cheese, and diet lemonade for lunch; and Hamburger [place] with cheese or spaghetti for dinner.

## 2025-02-03 ENCOUNTER — TELEPHONE (OUTPATIENT)
Dept: ENDOCRINOLOGY | Age: 65
End: 2025-02-03

## 2025-02-03 NOTE — TELEPHONE ENCOUNTER
Patient has lost her omnipod controller. She needs a new one.     Paige, patient said her dexcom go to [facility].

## 2025-02-26 ENCOUNTER — OFFICE VISIT (OUTPATIENT)
Dept: OBGYN CLINIC | Age: 65
End: 2025-02-26
Payer: MEDICARE

## 2025-02-26 VITALS
DIASTOLIC BLOOD PRESSURE: 80 MMHG | SYSTOLIC BLOOD PRESSURE: 144 MMHG | HEIGHT: 68 IN | BODY MASS INDEX: 20.92 KG/M2 | WEIGHT: 138 LBS

## 2025-02-26 DIAGNOSIS — N83.202 LEFT OVARIAN CYST: Primary | ICD-10-CM

## 2025-02-26 PROCEDURE — G8420 CALC BMI NORM PARAMETERS: HCPCS | Performed by: OBSTETRICS & GYNECOLOGY

## 2025-02-26 PROCEDURE — 3017F COLORECTAL CA SCREEN DOC REV: CPT | Performed by: OBSTETRICS & GYNECOLOGY

## 2025-02-26 PROCEDURE — 3077F SYST BP >= 140 MM HG: CPT | Performed by: OBSTETRICS & GYNECOLOGY

## 2025-02-26 PROCEDURE — G8400 PT W/DXA NO RESULTS DOC: HCPCS | Performed by: OBSTETRICS & GYNECOLOGY

## 2025-02-26 PROCEDURE — 1123F ACP DISCUSS/DSCN MKR DOCD: CPT | Performed by: OBSTETRICS & GYNECOLOGY

## 2025-02-26 PROCEDURE — G8427 DOCREV CUR MEDS BY ELIG CLIN: HCPCS | Performed by: OBSTETRICS & GYNECOLOGY

## 2025-02-26 PROCEDURE — 3079F DIAST BP 80-89 MM HG: CPT | Performed by: OBSTETRICS & GYNECOLOGY

## 2025-02-26 PROCEDURE — 1090F PRES/ABSN URINE INCON ASSESS: CPT | Performed by: OBSTETRICS & GYNECOLOGY

## 2025-02-26 PROCEDURE — 1036F TOBACCO NON-USER: CPT | Performed by: OBSTETRICS & GYNECOLOGY

## 2025-02-26 PROCEDURE — 99202 OFFICE O/P NEW SF 15 MIN: CPT | Performed by: OBSTETRICS & GYNECOLOGY

## 2025-02-26 SDOH — ECONOMIC STABILITY: FOOD INSECURITY: WITHIN THE PAST 12 MONTHS, YOU WORRIED THAT YOUR FOOD WOULD RUN OUT BEFORE YOU GOT MONEY TO BUY MORE.: NEVER TRUE

## 2025-02-26 SDOH — ECONOMIC STABILITY: FOOD INSECURITY: WITHIN THE PAST 12 MONTHS, THE FOOD YOU BOUGHT JUST DIDN'T LAST AND YOU DIDN'T HAVE MONEY TO GET MORE.: NEVER TRUE

## 2025-02-26 NOTE — PROGRESS NOTES
Shelli  is a 64 y.o. female, , No LMP recorded (lmp unknown). Patient is postmenopausal.,  who presents with c/o Ovarian Cyst and Pelvic Pain      Reports left ovarian cyst(s) started 1 years ago when is was first seen on CT at 6.7cm.  Ca-125 was WNL.  Recent US ~ 3 wks ago shows the mass now to be ~ 8 x 7cm . Does have some pain on that left side.  Feels like it is pressing on her colon.    All relevant previous notes, labs and/or imaging performed by her previous gyn were reviewed and confirmed with pt today.  I interpreted these results and D/W pt my opinion and recommendations accordingly.     H.o diabetes, last A1C = 13.7      HISTORY:  Shelli     has a past medical history of Asthma, CKD stage 2 due to type 1 diabetes mellitus (HCC), Hyperlipidemia, Hypertension, Lumbar disc disorder with myelopathy, Noncompliance with treatment, and Type 1 diabetes (HCC).    has a past surgical history that includes knee surgery (Right) and  section..    Her current meds are   Current Outpatient Medications:     Insulin Disposable Pump (OMNIPOD 5 MYGE7T1 PODS GEN 5) MISC, Use to deliver insulin, change every 3 days, E10.65 G7 compatible, Disp: 30 each, Rfl: 0    Continuous Glucose Sensor (DEXCOM G7 SENSOR) MISC, COMPATIBLE with TANDEM PUMP, Change every 10 days, E10.65 dispense with underline below ID number on side of box, Disp: 9 each, Rfl: 0    Insulin Infusion Pump ELVIN, Pump settings:  Omnipod 5 standard pattern- 24 hour total 19 units Max basal 1.5u/hr Time  00:00  0.8 Carb Ratio 00:00  10 Insulin Sensitivity 45 Target low  150 Target high 180 Active Insulin time 3:00 Max Bolus 9 units, Disp: 1 each, Rfl: 0    mupirocin (BACTROBAN) 2 % ointment, Apply topically 3 times daily., Disp: 30 g, Rfl: 1    LANTUS SOLOSTAR 100 UNIT/ML injection pen, 36 units q am, Disp: 30 mL, Rfl: 1    NOVOLOG 100 UNIT/ML injection vial, Use with insulin pump, max daily dose 50 units, Disp: 50 mL, Rfl: 3    NOVOLOG FLEXPEN 100

## 2025-03-03 ENCOUNTER — TELEPHONE (OUTPATIENT)
Dept: ENDOCRINOLOGY | Age: 65
End: 2025-03-03

## 2025-03-03 DIAGNOSIS — E10.65 TYPE 1 DIABETES MELLITUS WITH HYPERGLYCEMIA (HCC): Primary | ICD-10-CM

## 2025-03-03 NOTE — TELEPHONE ENCOUNTER
Got pump set up a week ago. She doing a little bit better giving 30 units Lantus in morning. Taking 6 units of Novolog with meals. Numbers are coming down. Using by injections. Not sure she will continue with pump.      Will be setting up again this week with Omnipod to retrain.

## 2025-03-03 NOTE — TELEPHONE ENCOUNTER
Per Omnipod:    Just to update on where we are with Ms. Horn on Omnipod:  Scheduled 2/15 - cancelled on 2/14 awaiting new controller - delivered 2/14  Scheduled 2/22 - cancelled  Scheduled 2/26 - presented but without glasses and unable to operate seeing syringe or information on controller and in a rush to get back home. Controller set up and placed but unable to update new controller on wifi system to obtain custom foods - patient in a hurry and left.   Phone call 2/27 - States glucose went down to 220's with pod placed 2/26 with arrows on Dexcom pointing down with double arrows so she panicked and treated, sending glucose into the 300's. Per her account the pod came off in the shower so she replaced.  She feels she set the new pod up correctly. The new pod would not deliver insulin correctly and she was just reading \"hi\" on Dexcom - based on experience on 2/26, she was still confusing connecting the controller to the pod and wanted to put her iphone with Dexcom next to pod - I think this likely happened again when she put pod on, but we'll never know. States she was trying to give her 45 unit bolus but it was only giving 1.5 units so she started pushing buttons. Best I can tell, she was confused that CF was 45??? It appears based on what she was reading to me that she has changed her settings that were programmed into pump - she read off a different number on her basal setting than what was programmed in on 2/26.   Scheduled 3/1 - cancelled but stated she would just put this pod on and contact me if she had issues. I advised her not to place the pod until the settings were verified in her controller.     Thanks,  Lidia

## 2025-03-04 ENCOUNTER — CARE COORDINATION (OUTPATIENT)
Dept: CARE COORDINATION | Facility: CLINIC | Age: 65
End: 2025-03-04

## 2025-03-04 NOTE — CARE COORDINATION
Ambulatory Care Coordination Note     3/4/2025 9:43 AM     Care Summary Note:   Outreach for High Risk Case Management - per Direct Provider Referral  Unable to reach, unable to leave a message  Will set reminder for another call.    Goal of improved Diabetes Self Management   - Hgb A1c on 1/31 - 14.0   - Hgb A1c on 2/24 - 13.7  Insulin Pump/Omnipod Training set up for this week  History of poor medication compliance - per chart review  Current insulin regimen:   - 26 units Lantus in the mornings   - 6 units Novolog with meals   - Dexcom G7 CGM > gets concerned when BS drops to 250  Foot wound noted while at Endocrinology 1/31   - mupirocin (BACTROBAN) 2 % ointment; Apply topically 3 times daily.      ACM: Denisse Cloud RN     Method of communication with provider: chart routing.      PCP/Specialist follow up:   Future Appointments         Provider Specialty Dept Phone    5/7/2025 11:00 AM Lacey Cruz PA-C Endocrinology [phone]

## 2025-03-04 NOTE — TELEPHONE ENCOUNTER
Per Lidia with Omnipod, patient will be set up for another training to better understand how to use Omnipod. Will call back if any issues.

## 2025-03-04 NOTE — TELEPHONE ENCOUNTER
Novolog:  : 8 units AC breakfast/lunch, 6 units supper plus correction 1 for 100 above 200 mg/dL.     Please ascertain how much long acting insulin she is taking daily.     Historically she has difficulty following instructions, I do not think her injection technique is adequate. She is fearful of lows so eats to keep her sugars over 200. We agreed on a goal of 150-200 on the pump but she has been unsucessful at using the pump and changing it out    She needs all the help she can get or is WILLING to accept

## 2025-03-06 ENCOUNTER — CARE COORDINATION (OUTPATIENT)
Dept: CARE COORDINATION | Facility: CLINIC | Age: 65
End: 2025-03-06

## 2025-03-06 NOTE — CARE COORDINATION
Ambulatory Care Coordination Note     3/6/2025 2:58 PM     Care Summary Note:   Outreach for High Risk Case Management -spoke briefly with patient  Conversation cut short as patient was driving and said she would call back.  Stated that she had been on the phone with Lidia/Jovanni hess     ACM: Denisse Cloud RN       PCP/Specialist follow up:   Future Appointments         Provider Specialty Dept Phone    5/7/2025 11:00 AM Lacey Cruz PA-C Endocrinology [phone]

## 2025-03-13 ENCOUNTER — CARE COORDINATION (OUTPATIENT)
Dept: CARE COORDINATION | Facility: CLINIC | Age: 65
End: 2025-03-13

## 2025-03-13 NOTE — CARE COORDINATION
Ambulatory Care Coordination Note     3/13/2025 1:47 PM     Care Summary Note:   Outreach per referral endocrinology provider  Spoke briefly with patient, but she was not inclined to discuss her diabetes today.  She was not happy that this ACM was able to see her chart.   Will make another attempt following colonoscopy procedure.     ACM: Denisse Cloud RN     Method of communication with provider: chart routing.        PCP/Specialist follow up:   Future Appointments         Provider Specialty Dept Phone    5/7/2025 11:00 AM Lacey Cruz PA-C Endocrinology [phone]

## 2025-03-19 ENCOUNTER — TELEPHONE (OUTPATIENT)
Dept: ENDOCRINOLOGY | Age: 65
End: 2025-03-19

## 2025-03-19 DIAGNOSIS — E10.65 TYPE 1 DIABETES MELLITUS WITH HYPERGLYCEMIA (HCC): ICD-10-CM

## 2025-03-19 RX ORDER — INSULIN GLARGINE 100 [IU]/ML
INJECTION, SOLUTION SUBCUTANEOUS
Qty: 30 ML | Refills: 0 | Status: SHIPPED | OUTPATIENT
Start: 2025-03-19

## 2025-03-19 NOTE — TELEPHONE ENCOUNTER
Patient needs Lantus sent into [facility]. She is not using pump yet because she will be having a procedure soon.

## 2025-03-19 NOTE — TELEPHONE ENCOUNTER
90 day supply of lantus sent to [facility]    If pt is not on pump we need to ensure she is taking 36 units of lantus every day around the same time AND taking novolog before meals. 8 units BEFORE breakfast, 8 units BEFORE lunch, and 6 units BEFORE supper plus correction, add 1 unit if glucose is over 200 and add 2 units if glucose is over 300    Please remind pt that we want her glucose levels to come DOWN into at least the mid 100s. We are not aiming for low blood sugars but trying to prevent highs    The  Ivett Cloud was asked to contact patient and talk about diabetes management to help get patient whatever she needs to gain better control. I would appreciate if Ms. Horn would talk to ivett who is likely able to help her.

## 2025-03-19 NOTE — TELEPHONE ENCOUNTER
Spoke with patient, repeated twice the instructions so she could write down and then read the instructions back to me.

## 2025-03-20 ENCOUNTER — CARE COORDINATION (OUTPATIENT)
Dept: CARE COORDINATION | Facility: CLINIC | Age: 65
End: 2025-03-20

## 2025-03-20 NOTE — CARE COORDINATION
Ambulatory Care Coordination Note     3/20/2025 11:40 AM     Care Summary Note:   Outreach per direct provider referral  Unable to reach  Unable to leave a message  Patient was made aware that ACM would call   - will try again tomorrow     ACM: Denisse Cloud RN      PCP/Specialist follow up:   Future Appointments         Provider Specialty Dept Phone    5/7/2025 11:00 AM Lacey Cruz PA-C Endocrinology [phone]

## 2025-03-21 ENCOUNTER — CARE COORDINATION (OUTPATIENT)
Dept: CARE COORDINATION | Facility: CLINIC | Age: 65
End: 2025-03-21

## 2025-03-21 NOTE — CARE COORDINATION
Ambulatory Care Coordination Note     3/21/2025 10:54 AM     Care Summary Note:   Outreach per direct provider referral  Unable to reach  Unable to leave a message - mailbox is full  Patient was made aware that ACM would call     ACM: Denisse Cloud RN     PCP/Specialist follow up:   Future Appointments         Provider Specialty Dept Phone    5/7/2025 11:00 AM Lacey Cruz PA-C Endocrinology [phone]

## 2025-03-26 ENCOUNTER — CARE COORDINATION (OUTPATIENT)
Dept: CARE COORDINATION | Facility: CLINIC | Age: 65
End: 2025-03-26

## 2025-03-26 NOTE — CARE COORDINATION
Ambulatory Care Coordination Note     3/26/2025 11:17 AM     Outreach for High Risk Case Management   Unable to reach  Unable to leave a message     ACM: Denisse Cloud, FABIOLA    PCP/Specialist follow up:   Future Appointments         Provider Specialty Dept Phone    5/7/2025 11:00 AM Lacey Cruz PA-C Endocrinology [phone]

## 2025-04-02 ENCOUNTER — CARE COORDINATION (OUTPATIENT)
Dept: CARE COORDINATION | Facility: CLINIC | Age: 65
End: 2025-04-02

## 2025-04-02 NOTE — CARE COORDINATION
Ambulatory Care Coordination Note     4/2/2025 12:24 PM     Outreach for High Risk Case Management  Direct PCP referral  Unable to reach  Not able to leave VM today     ACM: Denisse Cloud, FABIOLA         PCP/Specialist follow up:   Future Appointments         Provider Specialty Dept Phone    5/7/2025 11:00 AM Lacey Cruz PA-C Endocrinology [phone]

## 2025-04-17 ENCOUNTER — CARE COORDINATION (OUTPATIENT)
Dept: CARE COORDINATION | Facility: CLINIC | Age: 65
End: 2025-04-17

## 2025-04-17 NOTE — CARE COORDINATION
Ambulatory Care Coordination Note     4/17/2025 10:57 AM     Care Summary Note:   Outreach for High Risk Case Management / Diabetes Self Management  Direct Endocrinology referral  Unable to reach multiple calls.     ACM: Denisse Cloud RN     PCP/Specialist follow up:   Future Appointments         Provider Specialty Dept Phone    5/7/2025 11:00 AM Lacey Cruz PA-C Endocrinology [phone]

## 2025-04-17 NOTE — TELEPHONE ENCOUNTER
Thanks. I am going to keep trying to connect you two. I am very concerned about her health     Paige- can you have Denisse's number available to share with Ms. Horn if you talk to her. Could you send her a mychart explaining that I am trying to connect them so Denisse can help her get and use her medication and supplies to better control her diabetes?

## 2025-04-21 DIAGNOSIS — E10.65 TYPE 1 DIABETES MELLITUS WITH HYPERGLYCEMIA (HCC): ICD-10-CM

## 2025-04-21 DIAGNOSIS — E10.65 HYPERGLYCEMIA DUE TO TYPE 1 DIABETES MELLITUS (HCC): ICD-10-CM

## 2025-04-21 RX ORDER — INSULIN ASPART 100 [IU]/ML
INJECTION, SOLUTION INTRAVENOUS; SUBCUTANEOUS
Qty: 45 ML | Refills: 3 | OUTPATIENT
Start: 2025-04-21

## 2025-04-21 RX ORDER — BLOOD-GLUCOSE METER
KIT MISCELLANEOUS
Qty: 300 EACH | Refills: 3 | Status: SHIPPED | OUTPATIENT
Start: 2025-04-21

## 2025-04-21 RX ORDER — INSULIN GLARGINE 100 [IU]/ML
INJECTION, SOLUTION SUBCUTANEOUS
Qty: 30 ML | Refills: 0 | Status: SHIPPED | OUTPATIENT
Start: 2025-04-21

## 2025-04-21 RX ORDER — INSULIN GLARGINE 100 [IU]/ML
INJECTION, SOLUTION SUBCUTANEOUS
Qty: 30 ML | Refills: 0 | Status: SHIPPED | OUTPATIENT
Start: 2025-04-21 | End: 2025-04-21

## 2025-04-21 RX ORDER — INSULIN ASPART 100 [IU]/ML
INJECTION, SOLUTION INTRAVENOUS; SUBCUTANEOUS
Qty: 50 ML | Refills: 3 | Status: CANCELLED | OUTPATIENT
Start: 2025-04-21

## 2025-04-21 RX ORDER — INSULIN ASPART 100 [IU]/ML
INJECTION, SOLUTION INTRAVENOUS; SUBCUTANEOUS
Qty: 45 ML | Refills: 0 | Status: SHIPPED | OUTPATIENT
Start: 2025-04-21

## 2025-04-21 NOTE — TELEPHONE ENCOUNTER
Patient called requesting refills on novolog, lantus, and freestyle test strips sent to [facility]. She also stated that she is no longer on the pump.She stated \"it tried to kill her\" and back to her original meds she said.

## 2025-04-21 NOTE — TELEPHONE ENCOUNTER
I sent a single 90 day supply of lantus and novolog to [facility].    I would STRONGLY recommend that pt continue to use the dexcom even if on injections    She needs a visit to discuss her injection regimen. It is VITAL that was have glucose data, ideally on the dexcom if she brings the reader, or worst care on fingerstick machine that we can download AND/or a blood glucose log that has both her readings and the amount of insulin she is using     I would encourage her to connect with diabetes education for help with the actual injection technique and to review how to manage type 1 diabetes on injections instead of pump

## 2025-05-07 ENCOUNTER — OFFICE VISIT (OUTPATIENT)
Dept: ENDOCRINOLOGY | Age: 65
End: 2025-05-07
Payer: MEDICARE

## 2025-05-07 VITALS
SYSTOLIC BLOOD PRESSURE: 122 MMHG | OXYGEN SATURATION: 98 % | BODY MASS INDEX: 21.61 KG/M2 | HEIGHT: 67 IN | DIASTOLIC BLOOD PRESSURE: 78 MMHG | HEART RATE: 91 BPM

## 2025-05-07 DIAGNOSIS — E78.00 HYPERCHOLESTEROLEMIA: ICD-10-CM

## 2025-05-07 DIAGNOSIS — I10 ESSENTIAL HYPERTENSION: ICD-10-CM

## 2025-05-07 DIAGNOSIS — N18.2 CKD STAGE 2 DUE TO TYPE 1 DIABETES MELLITUS (HCC): ICD-10-CM

## 2025-05-07 DIAGNOSIS — E10.65 TYPE 1 DIABETES MELLITUS WITH HYPERGLYCEMIA (HCC): Primary | ICD-10-CM

## 2025-05-07 DIAGNOSIS — E10.22 CKD STAGE 2 DUE TO TYPE 1 DIABETES MELLITUS (HCC): ICD-10-CM

## 2025-05-07 DIAGNOSIS — R80.9 MICROALBUMINURIA DUE TO TYPE 1 DIABETES MELLITUS (HCC): ICD-10-CM

## 2025-05-07 DIAGNOSIS — E10.29 MICROALBUMINURIA DUE TO TYPE 1 DIABETES MELLITUS (HCC): ICD-10-CM

## 2025-05-07 PROCEDURE — 99215 OFFICE O/P EST HI 40 MIN: CPT | Performed by: PHYSICIAN ASSISTANT

## 2025-05-07 PROCEDURE — 1036F TOBACCO NON-USER: CPT | Performed by: PHYSICIAN ASSISTANT

## 2025-05-07 PROCEDURE — 3017F COLORECTAL CA SCREEN DOC REV: CPT | Performed by: PHYSICIAN ASSISTANT

## 2025-05-07 PROCEDURE — 1090F PRES/ABSN URINE INCON ASSESS: CPT | Performed by: PHYSICIAN ASSISTANT

## 2025-05-07 PROCEDURE — 3046F HEMOGLOBIN A1C LEVEL >9.0%: CPT | Performed by: PHYSICIAN ASSISTANT

## 2025-05-07 PROCEDURE — 3078F DIAST BP <80 MM HG: CPT | Performed by: PHYSICIAN ASSISTANT

## 2025-05-07 PROCEDURE — 1123F ACP DISCUSS/DSCN MKR DOCD: CPT | Performed by: PHYSICIAN ASSISTANT

## 2025-05-07 PROCEDURE — 3074F SYST BP LT 130 MM HG: CPT | Performed by: PHYSICIAN ASSISTANT

## 2025-05-07 PROCEDURE — G8400 PT W/DXA NO RESULTS DOC: HCPCS | Performed by: PHYSICIAN ASSISTANT

## 2025-05-07 PROCEDURE — 95251 CONT GLUC MNTR ANALYSIS I&R: CPT | Performed by: PHYSICIAN ASSISTANT

## 2025-05-07 PROCEDURE — G8427 DOCREV CUR MEDS BY ELIG CLIN: HCPCS | Performed by: PHYSICIAN ASSISTANT

## 2025-05-07 PROCEDURE — 2022F DILAT RTA XM EVC RTNOPTHY: CPT | Performed by: PHYSICIAN ASSISTANT

## 2025-05-07 PROCEDURE — G8420 CALC BMI NORM PARAMETERS: HCPCS | Performed by: PHYSICIAN ASSISTANT

## 2025-05-07 RX ORDER — INSULIN ASPART 100 [IU]/ML
INJECTION, SOLUTION INTRAVENOUS; SUBCUTANEOUS
Qty: 15 ML | Refills: 1 | Status: SHIPPED | OUTPATIENT
Start: 2025-05-07

## 2025-05-07 RX ORDER — INSULIN GLARGINE 300 U/ML
40 INJECTION, SOLUTION SUBCUTANEOUS NIGHTLY
Qty: 69 ML | Refills: 1 | Status: SHIPPED | OUTPATIENT
Start: 2025-05-07

## 2025-05-07 RX ORDER — FAMOTIDINE 20 MG/1
20 TABLET, FILM COATED ORAL 2 TIMES DAILY
COMMUNITY
Start: 2025-05-01 | End: 2025-05-31

## 2025-05-07 RX ORDER — INSULIN GLARGINE 100 [IU]/ML
INJECTION, SOLUTION SUBCUTANEOUS
Qty: 30 ML | Refills: 0 | Status: SHIPPED | OUTPATIENT
Start: 2025-05-07

## 2025-05-07 NOTE — PROGRESS NOTES
leg: No edema.      Left lower leg: No edema.   Feet:      Right foot:      Protective Sensation: 3 sites tested.  2 sites sensed.      Skin integrity: Callus and dry skin present.      Left foot:      Protective Sensation: 3 sites tested.   1 site sensed.     Skin integrity: Callus, dry skin and fissure present.      Comments: Callus and fissure of left distal hallux     Right hallux with punctate erosion of distal tip no heat, erythema, purulence, or sign of infection      Left 3rd toe with punctate erosion of plantar surface distal to DIP no heat, erythema, purulence, or sign of infection      Lymphadenopathy:      Cervical: No cervical adenopathy.   Skin:     General: Skin is warm and dry.   Neurological:      General: No focal deficit present.      Mental Status: She is alert.      Sensory: Sensation is intact.   Psychiatric:         Mood and Affect: Mood normal.         Behavior: Behavior normal.         Thought Content: Thought content normal.         Judgment: Judgment normal.             Return in 8 weeks (on 7/2/2025) for Type 1 Diabetes f/u.        Portions of this note were generated with the assistance of voice recogniton software.  As such, some errors in transcription may be present.

## 2025-05-12 ENCOUNTER — TELEPHONE (OUTPATIENT)
Dept: ENDOCRINOLOGY | Age: 65
End: 2025-05-12

## 2025-05-12 DIAGNOSIS — E10.65 TYPE 1 DIABETES MELLITUS WITH HYPERGLYCEMIA (HCC): ICD-10-CM

## 2025-05-12 RX ORDER — INSULIN GLARGINE 300 U/ML
40 INJECTION, SOLUTION SUBCUTANEOUS NIGHTLY
Qty: 69 ML | Refills: 1 | Status: SHIPPED | OUTPATIENT
Start: 2025-05-12 | End: 2025-05-14 | Stop reason: SDUPTHER

## 2025-05-12 RX ORDER — INSULIN GLARGINE 100 [IU]/ML
INJECTION, SOLUTION SUBCUTANEOUS
Qty: 30 ML | Refills: 0 | Status: SHIPPED | OUTPATIENT
Start: 2025-05-12 | End: 2025-05-14

## 2025-05-12 RX ORDER — INSULIN ASPART 100 [IU]/ML
INJECTION, SOLUTION INTRAVENOUS; SUBCUTANEOUS
Qty: 15 ML | Refills: 1 | Status: SHIPPED | OUTPATIENT
Start: 2025-05-12

## 2025-05-12 NOTE — TELEPHONE ENCOUNTER
Pt needs long acting insulin every 24 hours. She was on lantus and I decreased the dose at out last visit. We are trying to move her from lantus to toujeo. The facility needs to give one or the other. We are hopeful they are able to obtain toujeo and start that in place of lantus

## 2025-05-12 NOTE — TELEPHONE ENCOUNTER
Spoke to staff    They do not have new orders    Clarified plan of lowering lantus to 36 then changing to 40 units of toujeo     Novolog increased from 7 units TID to 8 units TID    New RX will be faxed to facility at [phone]

## 2025-05-12 NOTE — TELEPHONE ENCOUNTER
Phone call from patient stating she has not received any insulin today. I spoke with Xena @[phone] and informed her of what she should be taking but patient has not been given any insulin at all today.

## 2025-05-13 ENCOUNTER — TELEPHONE (OUTPATIENT)
Dept: ENDOCRINOLOGY | Age: 65
End: 2025-05-13

## 2025-05-14 ENCOUNTER — TELEPHONE (OUTPATIENT)
Dept: ENDOCRINOLOGY | Age: 65
End: 2025-05-14

## 2025-05-14 DIAGNOSIS — E10.65 TYPE 1 DIABETES MELLITUS WITH HYPERGLYCEMIA (HCC): ICD-10-CM

## 2025-05-14 RX ORDER — INSULIN GLARGINE 300 U/ML
30 INJECTION, SOLUTION SUBCUTANEOUS NIGHTLY
Qty: 60 ML | Refills: 1 | Status: SHIPPED | OUTPATIENT
Start: 2025-05-14 | End: 2025-05-16 | Stop reason: SINTOL

## 2025-05-14 NOTE — TELEPHONE ENCOUNTER
Phone call from Luann Emerson at [facility] stating patient is refusing the 40 units and only wants 28 units. Continues to refuse and cause issues. Geraldo was D/C'd and start Toujeo at 40 units per your order. Facility wants to know what to do.      Luann Emerson  Office: [phone]  Cell; [phone]

## 2025-05-14 NOTE — TELEPHONE ENCOUNTER
Patient called in stating she feels she is getting too much insulin at night. Her sugars go from 221 quickly down to 169 and continues to drop so she eats all the time to keep up. Wants to know if there are any labs to get done for this or what to do because she does not want to eat all the time during the night.

## 2025-05-14 NOTE — TELEPHONE ENCOUNTER
Spoke to pt  Taking 40    Willing to take 30 units of toujeo and allow glucose to reside in 200s without eating constantly to raise    Spoke to Luann at facility. Will fax new order

## 2025-05-16 ENCOUNTER — TELEPHONE (OUTPATIENT)
Dept: ENDOCRINOLOGY | Age: 65
End: 2025-05-16

## 2025-05-16 DIAGNOSIS — E10.65 TYPE 1 DIABETES MELLITUS WITH HYPERGLYCEMIA (HCC): ICD-10-CM

## 2025-05-16 RX ORDER — INSULIN GLARGINE 100 [IU]/ML
INJECTION, SOLUTION SUBCUTANEOUS
Qty: 30 ML | Refills: 0 | Status: SHIPPED | OUTPATIENT
Start: 2025-05-16

## 2025-05-16 NOTE — TELEPHONE ENCOUNTER
Phone call from patient stating since she has been taking Toujeo, her heart is palpitating too much. She would like to go back to [facility].

## 2025-05-28 DIAGNOSIS — E10.65 TYPE 1 DIABETES MELLITUS WITH HYPERGLYCEMIA (HCC): ICD-10-CM

## 2025-05-29 NOTE — TELEPHONE ENCOUNTER
To the best of my knowledge cannot do a sliding scale in facility.     We lowered the lantus back down to 28 with the intention of being more consistent with the novolog. It is critical that she take novolog before every meal because she has type 1 diabetes and her body does not make insulin.     Her lack of insulin use puts her at high risk for illness, debility, and DEATH    See if facility can share glucose readings and if there is any way they document when patient takes vs refuses her short acting insulin

## 2025-05-30 RX ORDER — BLOOD-GLUCOSE METER
KIT MISCELLANEOUS
Qty: 300 EACH | Refills: 3 | Status: SHIPPED | OUTPATIENT
Start: 2025-05-30

## 2025-05-30 RX ORDER — INSULIN ASPART 100 [IU]/ML
INJECTION, SOLUTION INTRAVENOUS; SUBCUTANEOUS
Qty: 30 ML | Refills: 1 | Status: SHIPPED | OUTPATIENT
Start: 2025-05-30

## 2025-05-30 RX ORDER — INSULIN GLARGINE 100 [IU]/ML
INJECTION, SOLUTION SUBCUTANEOUS
Qty: 30 ML | Refills: 1 | Status: SHIPPED | OUTPATIENT
Start: 2025-05-30

## 2025-05-30 RX ORDER — PEN NEEDLE, DIABETIC, SAFETY 29GX 5/16"
NEEDLE, DISPOSABLE MISCELLANEOUS
Qty: 500 EACH | Refills: 3 | Status: SHIPPED | OUTPATIENT
Start: 2025-05-30

## 2025-06-02 ENCOUNTER — TELEPHONE (OUTPATIENT)
Dept: ENDOCRINOLOGY | Age: 65
End: 2025-06-02

## 2025-06-02 DIAGNOSIS — E10.65 TYPE 1 DIABETES MELLITUS WITH HYPERGLYCEMIA (HCC): Primary | ICD-10-CM

## 2025-06-02 RX ORDER — PEN NEEDLE, DIABETIC 30 GX5/16"
NEEDLE, DISPOSABLE MISCELLANEOUS
Qty: 500 EACH | Refills: 3 | Status: SHIPPED | OUTPATIENT
Start: 2025-06-02

## 2025-07-18 RX ORDER — GLUCAGON INJECTION, SOLUTION 1 MG/.2ML
1 INJECTION, SOLUTION SUBCUTANEOUS PRN
Refills: 1 | OUTPATIENT
Start: 2025-07-18

## 2025-08-05 ENCOUNTER — OFFICE VISIT (OUTPATIENT)
Dept: UROLOGY | Age: 65
End: 2025-08-05
Payer: MEDICARE

## 2025-08-05 DIAGNOSIS — R33.9 URINARY RETENTION: Primary | ICD-10-CM

## 2025-08-05 DIAGNOSIS — N39.490 OVERFLOW INCONTINENCE: ICD-10-CM

## 2025-08-05 LAB
BILIRUBIN, URINE, POC: NEGATIVE
BLOOD URINE, POC: NORMAL
GLUCOSE URINE, POC: 500 MG/DL
KETONES, URINE, POC: NEGATIVE MG/DL
LEUKOCYTE ESTERASE, URINE, POC: NORMAL
NITRITE, URINE, POC: NEGATIVE
PH, URINE, POC: 7.5 (ref 4.6–8)
PROTEIN,URINE, POC: 300 MG/DL
PVR, POC: 719 CC
SPECIFIC GRAVITY, URINE, POC: 1.02 (ref 1–1.03)
URINALYSIS CLARITY, POC: NORMAL
URINALYSIS COLOR, POC: NORMAL
UROBILINOGEN, POC: NORMAL MG/DL

## 2025-08-05 PROCEDURE — 0509F URINE INCON PLAN DOCD: CPT | Performed by: PHYSICIAN ASSISTANT

## 2025-08-05 PROCEDURE — 1090F PRES/ABSN URINE INCON ASSESS: CPT | Performed by: PHYSICIAN ASSISTANT

## 2025-08-05 PROCEDURE — 81003 URINALYSIS AUTO W/O SCOPE: CPT | Performed by: PHYSICIAN ASSISTANT

## 2025-08-05 PROCEDURE — G8420 CALC BMI NORM PARAMETERS: HCPCS | Performed by: PHYSICIAN ASSISTANT

## 2025-08-05 PROCEDURE — G8427 DOCREV CUR MEDS BY ELIG CLIN: HCPCS | Performed by: PHYSICIAN ASSISTANT

## 2025-08-05 PROCEDURE — 1036F TOBACCO NON-USER: CPT | Performed by: PHYSICIAN ASSISTANT

## 2025-08-05 PROCEDURE — 99204 OFFICE O/P NEW MOD 45 MIN: CPT | Performed by: PHYSICIAN ASSISTANT

## 2025-08-05 PROCEDURE — 3017F COLORECTAL CA SCREEN DOC REV: CPT | Performed by: PHYSICIAN ASSISTANT

## 2025-08-05 PROCEDURE — 51798 US URINE CAPACITY MEASURE: CPT | Performed by: PHYSICIAN ASSISTANT

## 2025-08-05 PROCEDURE — 1123F ACP DISCUSS/DSCN MKR DOCD: CPT | Performed by: PHYSICIAN ASSISTANT

## 2025-08-05 PROCEDURE — G8400 PT W/DXA NO RESULTS DOC: HCPCS | Performed by: PHYSICIAN ASSISTANT

## 2025-08-05 RX ORDER — MUPIROCIN 2 %
OINTMENT (GRAM) TOPICAL
Qty: 30 G | Refills: 1 | Status: SHIPPED | OUTPATIENT
Start: 2025-08-05

## 2025-08-05 RX ORDER — DOCUSATE SODIUM 100 MG/1
100 CAPSULE, LIQUID FILLED ORAL 2 TIMES DAILY
Qty: 60 CAPSULE | Refills: 0 | Status: SHIPPED | OUTPATIENT
Start: 2025-08-05 | End: 2025-09-04

## 2025-08-06 ENCOUNTER — TELEPHONE (OUTPATIENT)
Dept: UROLOGY | Age: 65
End: 2025-08-06

## 2025-08-06 RX ORDER — HYOSCYAMINE SULFATE 0.12 MG/1
0.12 TABLET SUBLINGUAL EVERY 4 HOURS PRN
Qty: 30 TABLET | Refills: 1 | Status: SHIPPED | OUTPATIENT
Start: 2025-08-06

## 2025-08-08 ENCOUNTER — TELEPHONE (OUTPATIENT)
Dept: UROLOGY | Age: 65
End: 2025-08-08

## 2025-08-08 ENCOUNTER — OFFICE VISIT (OUTPATIENT)
Dept: UROLOGY | Age: 65
End: 2025-08-08
Payer: MEDICARE

## 2025-08-08 DIAGNOSIS — R33.9 URINARY RETENTION: Primary | ICD-10-CM

## 2025-08-08 DIAGNOSIS — R31.0 GROSS HEMATURIA: ICD-10-CM

## 2025-08-08 LAB
BILIRUBIN, URINE, POC: NEGATIVE
BLOOD URINE, POC: NORMAL
GLUCOSE URINE, POC: 500 MG/DL
KETONES, URINE, POC: NEGATIVE MG/DL
LEUKOCYTE ESTERASE, URINE, POC: NORMAL
NITRITE, URINE, POC: NEGATIVE
PH, URINE, POC: 6.5 (ref 4.6–8)
PROTEIN,URINE, POC: 300 MG/DL
SPECIFIC GRAVITY, URINE, POC: 1.03 (ref 1–1.03)
URINALYSIS CLARITY, POC: NORMAL
URINALYSIS COLOR, POC: NORMAL
UROBILINOGEN, POC: NORMAL MG/DL

## 2025-08-08 PROCEDURE — 1123F ACP DISCUSS/DSCN MKR DOCD: CPT | Performed by: PHYSICIAN ASSISTANT

## 2025-08-08 PROCEDURE — G8400 PT W/DXA NO RESULTS DOC: HCPCS | Performed by: PHYSICIAN ASSISTANT

## 2025-08-08 PROCEDURE — 81003 URINALYSIS AUTO W/O SCOPE: CPT | Performed by: PHYSICIAN ASSISTANT

## 2025-08-08 PROCEDURE — 99213 OFFICE O/P EST LOW 20 MIN: CPT | Performed by: PHYSICIAN ASSISTANT

## 2025-08-08 PROCEDURE — G8420 CALC BMI NORM PARAMETERS: HCPCS | Performed by: PHYSICIAN ASSISTANT

## 2025-08-08 PROCEDURE — G8427 DOCREV CUR MEDS BY ELIG CLIN: HCPCS | Performed by: PHYSICIAN ASSISTANT

## 2025-08-08 PROCEDURE — 1090F PRES/ABSN URINE INCON ASSESS: CPT | Performed by: PHYSICIAN ASSISTANT

## 2025-08-08 PROCEDURE — 3017F COLORECTAL CA SCREEN DOC REV: CPT | Performed by: PHYSICIAN ASSISTANT

## 2025-08-08 PROCEDURE — 1036F TOBACCO NON-USER: CPT | Performed by: PHYSICIAN ASSISTANT

## 2025-08-14 ENCOUNTER — OFFICE VISIT (OUTPATIENT)
Dept: ENDOCRINOLOGY | Age: 65
End: 2025-08-14
Payer: MEDICARE

## 2025-08-14 ENCOUNTER — CLINICAL SUPPORT (OUTPATIENT)
Dept: UROLOGY | Age: 65
End: 2025-08-14

## 2025-08-14 VITALS
HEART RATE: 94 BPM | OXYGEN SATURATION: 96 % | HEIGHT: 67 IN | BODY MASS INDEX: 21.09 KG/M2 | DIASTOLIC BLOOD PRESSURE: 82 MMHG | SYSTOLIC BLOOD PRESSURE: 128 MMHG | WEIGHT: 134.4 LBS

## 2025-08-14 DIAGNOSIS — E10.65 TYPE 1 DIABETES MELLITUS WITH HYPERGLYCEMIA (HCC): Primary | ICD-10-CM

## 2025-08-14 DIAGNOSIS — N18.2 CKD STAGE 2 DUE TO TYPE 1 DIABETES MELLITUS (HCC): ICD-10-CM

## 2025-08-14 DIAGNOSIS — E10.22 CKD STAGE 2 DUE TO TYPE 1 DIABETES MELLITUS (HCC): ICD-10-CM

## 2025-08-14 DIAGNOSIS — I10 ESSENTIAL HYPERTENSION: ICD-10-CM

## 2025-08-14 DIAGNOSIS — E10.29 MICROALBUMINURIA DUE TO TYPE 1 DIABETES MELLITUS (HCC): ICD-10-CM

## 2025-08-14 DIAGNOSIS — R33.9 URINARY RETENTION: Primary | ICD-10-CM

## 2025-08-14 DIAGNOSIS — R80.9 MICROALBUMINURIA DUE TO TYPE 1 DIABETES MELLITUS (HCC): ICD-10-CM

## 2025-08-14 DIAGNOSIS — E78.00 HYPERCHOLESTEROLEMIA: ICD-10-CM

## 2025-08-14 LAB — HBA1C MFR BLD: 12.2 %

## 2025-08-14 PROCEDURE — 99214 OFFICE O/P EST MOD 30 MIN: CPT | Performed by: PHYSICIAN ASSISTANT

## 2025-08-14 PROCEDURE — G8420 CALC BMI NORM PARAMETERS: HCPCS | Performed by: PHYSICIAN ASSISTANT

## 2025-08-14 PROCEDURE — 1036F TOBACCO NON-USER: CPT | Performed by: PHYSICIAN ASSISTANT

## 2025-08-14 PROCEDURE — 1123F ACP DISCUSS/DSCN MKR DOCD: CPT | Performed by: PHYSICIAN ASSISTANT

## 2025-08-14 PROCEDURE — 3074F SYST BP LT 130 MM HG: CPT | Performed by: PHYSICIAN ASSISTANT

## 2025-08-14 PROCEDURE — 83036 HEMOGLOBIN GLYCOSYLATED A1C: CPT | Performed by: PHYSICIAN ASSISTANT

## 2025-08-14 PROCEDURE — 3046F HEMOGLOBIN A1C LEVEL >9.0%: CPT | Performed by: PHYSICIAN ASSISTANT

## 2025-08-14 PROCEDURE — G8400 PT W/DXA NO RESULTS DOC: HCPCS | Performed by: PHYSICIAN ASSISTANT

## 2025-08-14 PROCEDURE — 95251 CONT GLUC MNTR ANALYSIS I&R: CPT | Performed by: PHYSICIAN ASSISTANT

## 2025-08-14 PROCEDURE — 3079F DIAST BP 80-89 MM HG: CPT | Performed by: PHYSICIAN ASSISTANT

## 2025-08-14 PROCEDURE — G8427 DOCREV CUR MEDS BY ELIG CLIN: HCPCS | Performed by: PHYSICIAN ASSISTANT

## 2025-08-14 PROCEDURE — 3017F COLORECTAL CA SCREEN DOC REV: CPT | Performed by: PHYSICIAN ASSISTANT

## 2025-08-14 PROCEDURE — 2022F DILAT RTA XM EVC RTNOPTHY: CPT | Performed by: PHYSICIAN ASSISTANT

## 2025-08-14 PROCEDURE — 1090F PRES/ABSN URINE INCON ASSESS: CPT | Performed by: PHYSICIAN ASSISTANT

## 2025-08-14 RX ORDER — INSULIN ASPART 100 [IU]/ML
INJECTION, SOLUTION INTRAVENOUS; SUBCUTANEOUS
Qty: 30 ML | Refills: 0 | Status: SHIPPED | OUTPATIENT
Start: 2025-08-14

## 2025-08-14 RX ORDER — MUPIROCIN 2 %
OINTMENT (GRAM) TOPICAL
Qty: 30 G | Refills: 1 | Status: SHIPPED | OUTPATIENT
Start: 2025-08-14

## 2025-08-14 RX ORDER — INSULIN GLARGINE 100 [IU]/ML
INJECTION, SOLUTION SUBCUTANEOUS
Qty: 30 ML | Refills: 0 | Status: SHIPPED | OUTPATIENT
Start: 2025-08-14

## 2025-08-20 ENCOUNTER — OFFICE VISIT (OUTPATIENT)
Dept: UROLOGY | Age: 65
End: 2025-08-20
Payer: MEDICARE

## 2025-08-20 DIAGNOSIS — N39.46 MIXED INCONTINENCE: ICD-10-CM

## 2025-08-20 DIAGNOSIS — R33.9 INCOMPLETE BLADDER EMPTYING: Primary | ICD-10-CM

## 2025-08-20 LAB
BILIRUBIN, URINE, POC: NEGATIVE
BLOOD URINE, POC: NORMAL
GLUCOSE URINE, POC: 500 MG/DL
KETONES, URINE, POC: NEGATIVE MG/DL
LEUKOCYTE ESTERASE, URINE, POC: NORMAL
NITRITE, URINE, POC: NEGATIVE
PH, URINE, POC: 7 (ref 4.6–8)
PROTEIN,URINE, POC: 300 MG/DL
PVR, POC: 278 CC
SPECIFIC GRAVITY, URINE, POC: 1.02 (ref 1–1.03)
URINALYSIS CLARITY, POC: NORMAL
URINALYSIS COLOR, POC: NORMAL
UROBILINOGEN, POC: NORMAL MG/DL

## 2025-08-20 PROCEDURE — 1090F PRES/ABSN URINE INCON ASSESS: CPT | Performed by: PHYSICIAN ASSISTANT

## 2025-08-20 PROCEDURE — G8420 CALC BMI NORM PARAMETERS: HCPCS | Performed by: PHYSICIAN ASSISTANT

## 2025-08-20 PROCEDURE — 1123F ACP DISCUSS/DSCN MKR DOCD: CPT | Performed by: PHYSICIAN ASSISTANT

## 2025-08-20 PROCEDURE — 99214 OFFICE O/P EST MOD 30 MIN: CPT | Performed by: PHYSICIAN ASSISTANT

## 2025-08-20 PROCEDURE — G8427 DOCREV CUR MEDS BY ELIG CLIN: HCPCS | Performed by: PHYSICIAN ASSISTANT

## 2025-08-20 PROCEDURE — 81003 URINALYSIS AUTO W/O SCOPE: CPT | Performed by: PHYSICIAN ASSISTANT

## 2025-08-20 PROCEDURE — 51798 US URINE CAPACITY MEASURE: CPT | Performed by: PHYSICIAN ASSISTANT

## 2025-08-20 PROCEDURE — 0509F URINE INCON PLAN DOCD: CPT | Performed by: PHYSICIAN ASSISTANT

## 2025-08-20 PROCEDURE — 1036F TOBACCO NON-USER: CPT | Performed by: PHYSICIAN ASSISTANT

## 2025-08-20 PROCEDURE — G8400 PT W/DXA NO RESULTS DOC: HCPCS | Performed by: PHYSICIAN ASSISTANT

## 2025-08-20 PROCEDURE — 3017F COLORECTAL CA SCREEN DOC REV: CPT | Performed by: PHYSICIAN ASSISTANT

## 2025-08-20 RX ORDER — BETHANECHOL CHLORIDE 10 MG/1
10 TABLET ORAL 2 TIMES DAILY
Qty: 60 TABLET | Refills: 3 | Status: SHIPPED | OUTPATIENT
Start: 2025-08-20